# Patient Record
Sex: FEMALE | Race: BLACK OR AFRICAN AMERICAN | Employment: FULL TIME | URBAN - METROPOLITAN AREA
[De-identification: names, ages, dates, MRNs, and addresses within clinical notes are randomized per-mention and may not be internally consistent; named-entity substitution may affect disease eponyms.]

---

## 2020-09-10 ENCOUNTER — VIRTUAL VISIT (OUTPATIENT)
Dept: INTERNAL MEDICINE CLINIC | Age: 30
End: 2020-09-10

## 2020-09-10 DIAGNOSIS — E55.9 VITAMIN D DEFICIENCY: ICD-10-CM

## 2020-09-10 DIAGNOSIS — G43.809 OTHER MIGRAINE WITHOUT STATUS MIGRAINOSUS, NOT INTRACTABLE: Primary | ICD-10-CM

## 2020-09-10 DIAGNOSIS — E66.9 OBESITY (BMI 30-39.9): ICD-10-CM

## 2020-09-10 DIAGNOSIS — Z00.00 ROUTINE GENERAL MEDICAL EXAMINATION AT A HEALTH CARE FACILITY: ICD-10-CM

## 2020-09-10 PROCEDURE — 99204 OFFICE O/P NEW MOD 45 MIN: CPT | Performed by: INTERNAL MEDICINE

## 2020-09-10 RX ORDER — RIZATRIPTAN BENZOATE 10 MG/1
10 TABLET, ORALLY DISINTEGRATING ORAL
Qty: 12 TAB | Refills: 2 | Status: SHIPPED | OUTPATIENT
Start: 2020-09-10 | End: 2020-09-10

## 2020-09-10 RX ORDER — IBUPROFEN 200 MG
TABLET ORAL AS NEEDED
COMMUNITY
End: 2020-09-28 | Stop reason: DRUGHIGH

## 2020-09-10 NOTE — PROGRESS NOTES
Health Maintenance Due   Topic Date Due    DTaP/Tdap/Td series (1 - Tdap) 10/17/2013    PAP AKA CERVICAL CYTOLOGY  05/22/2018    Flu Vaccine (1) 09/01/2020       No chief complaint on file. 1. Have you been to the ER, urgent care clinic since your last visit? Hospitalized since your last visit? No    2. Have you seen or consulted any other health care providers outside of the [address] [place] [address] since your last visit? Include any pap smears or colon screening. No    3) Do you have an Advance Directive on file? no    4) Are you interested in receiving information on Advance Directives? NO      Patient is accompanied by self I have received verbal consent from Alysha Ibarra to discuss any/all medical information while they are present in the room.

## 2020-09-10 NOTE — PROGRESS NOTES
Purnima Giles is a 34 y.o. female who was seen by synchronous (real-time) audio-video technology on 9/10/2020 for Complete Physical (New pt; establish care); Migraine; and Obesity        Assessment & Plan:   Diagnoses and all orders for this visit:    1. Other migraine without status migrainosus, not intractable  She is having migraine attack at least 3 to 4 days a week. Probably needing regular daily maintenance therapy. Advised to avoid migraine triggers. Will try,  -     rizatriptan (MAXALT-MLT) 10 mg disintegrating tablet; Take 1 Tab by mouth once as needed for Migraine for up to 1 dose. Will refer,  -     REFERRAL TO NEUROLOGY  Follow-up in a month. 2. Routine general medical examination at a health care facility    Seems healthy but overweight. Advised to eat healthy exercise and lose weight. Will order,  -     CBC WITH AUTOMATED DIFF  -     METABOLIC PANEL, COMPREHENSIVE  -     LIPID PANEL  -     TSH 3RD GENERATION  -     URINALYSIS W/ RFLX MICROSCOPIC    3. Vitamin D deficiency    We will check,  -     VITAMIN D, 25 HYDROXY    4. Obesity (BMI 30-39. 9)    Addressed weight, diet and exercise with patient. Decrease carbohydrates (white foods, sweet foods, sweet drinks and alcohol), increase green leafy vegetables and protein (lean meats and beans) with each meal. Avoid fried foods. Eat 3-5 small meals daily. Do not skip meals. Increase water intake. Increase physical activity to 30 minutes daily for health benefit or 60 minutes daily to prevent weight regain, as tolerated. Get 7-8 hours uninterrupted sleep nightly. I spent at least 25 minutes on this visit with this established patient. Subjective:     Ms. Herring So is here to establish care. Reported migraine headache on and off very frequently. Pain is unilateral throbbing kind, associated with nausea sometimes photophobia. She has been suffering from it from early childhood. No migraine headache is almost 3-4 times a week.   She is not taking any prescription medicine for it. Denies numbness or weakness in the arms or legs. Most of the time migraine headache on left side of the head. Some residual above left eye. She has seen eye specialist, I was fine. She is legally blind on left eye. She is obese, trying to lose weight. Not doing exercise. Need physical.  Prior to Admission medications    Medication Sig Start Date End Date Taking? Authorizing Provider   ibuprofen (MOTRIN) 200 mg tablet Take  by mouth as needed for Pain. Yes Provider, Historical   rizatriptan (MAXALT-MLT) 10 mg disintegrating tablet Take 1 Tab by mouth once as needed for Migraine for up to 1 dose. 9/10/20 9/10/20 Yes Renee Allen MD   omeprazole (PRILOSEC) 40 mg capsule Take 1 Cap by mouth daily. 7/28/15 9/10/20  Orville Mena DO     Past Medical History:   Diagnosis Date    Abnormal Pap smear of cervix     Headache     LGSIL (low grade squamous intraepithelial dysplasia)     Pap smear for cervical cancer screening     Established with GYN       ROS significant for migraine headache.     Objective:     Patient-Reported Vitals 9/10/2020   Patient-Reported Weight 235lb   Patient-Reported LMP 8/20/20          Constitutional: [x] Appears well-developed and well-nourished [x] No apparent distress      [] Abnormal -     Mental status: [x] Alert and awake  [x] Oriented to person/place/time [x] Able to follow commands    [] Abnormal -   Bilateral eyes: EOMI, conjunctiva seems normal.  Sclera normal.    HENT: [x] Normocephalic, atraumatic  [] Abnormal -   [x] Mouth/Throat: Mucous membranes are moist    External Ears [x] Normal  [] Abnormal -    Neck: [x] No visualized mass [] Abnormal -     Pulmonary/Chest: [x] Respiratory effort normal   [x] No visualized signs of difficulty breathing or respiratory distress        [] Abnormal -      Musculoskeletal:   [x] Normal gait with no signs of ataxia         [x] Normal range of motion of neck        [] Abnormal - Neurological:        [x] No Facial Asymmetry (Cranial nerve 7 motor function) (limited exam due to video visit)          [x] No gaze palsy        [] Abnormal -          Skin:        [x] No significant exanthematous lesions or discoloration noted on facial skin         [] Abnormal -            Psychiatric:       [x] Normal Affect [] Abnormal -        [x] No Hallucinations    Other pertinent observable physical exam findings:-        We discussed the expected course, resolution and complications of the diagnosis(es) in detail. Medication risks, benefits, costs, interactions, and alternatives were discussed as indicated. I advised her to contact the office if her condition worsens, changes or fails to improve as anticipated. She expressed understanding with the diagnosis(es) and plan. Merline Ferris, who was evaluated through a patient-initiated, synchronous (real-time) audio-video encounter, and/or her healthcare decision maker, is aware that it is a billable service, with coverage as determined by her insurance carrier. She provided verbal consent to proceed: Yes, and patient identification was verified. It was conducted pursuant to the emergency declaration under the [address] [place] [address], [address] [place] [address] authority and the [employer] General Act. A caregiver was present when appropriate. Ability to conduct physical exam was limited. I was in the office. The patient was at home.       Dima Nunez MD

## 2020-09-28 ENCOUNTER — OFFICE VISIT (OUTPATIENT)
Dept: NEUROLOGY | Facility: CLINIC | Age: 30
End: 2020-09-28
Payer: COMMERCIAL

## 2020-09-28 VITALS
RESPIRATION RATE: 16 BRPM | SYSTOLIC BLOOD PRESSURE: 122 MMHG | DIASTOLIC BLOOD PRESSURE: 80 MMHG | WEIGHT: 247 LBS | BODY MASS INDEX: 39.7 KG/M2 | HEIGHT: 66 IN | HEART RATE: 92 BPM

## 2020-09-28 DIAGNOSIS — G43.709 CHRONIC MIGRAINE WITHOUT AURA WITHOUT STATUS MIGRAINOSUS, NOT INTRACTABLE: Primary | ICD-10-CM

## 2020-09-28 PROCEDURE — 99244 OFF/OP CNSLTJ NEW/EST MOD 40: CPT | Performed by: PSYCHIATRY & NEUROLOGY

## 2020-09-28 RX ORDER — RIZATRIPTAN BENZOATE 10 MG/1
10 TABLET, ORALLY DISINTEGRATING ORAL
COMMUNITY
End: 2021-04-14 | Stop reason: ALTCHOICE

## 2020-09-28 RX ORDER — PROMETHAZINE HYDROCHLORIDE 12.5 MG/1
12.5 TABLET ORAL
Qty: 30 TAB | Refills: 2 | OUTPATIENT
Start: 2020-09-28 | End: 2022-09-23

## 2020-09-28 RX ORDER — SUMATRIPTAN 100 MG/1
100 TABLET, FILM COATED ORAL
Qty: 9 TAB | Refills: 2 | Status: SHIPPED | OUTPATIENT
Start: 2020-09-28 | End: 2020-09-28

## 2020-09-28 RX ORDER — ESOMEPRAZOLE MAGNESIUM 20 MG/1
FOR SUSPENSION ORAL DAILY
COMMUNITY

## 2020-09-28 RX ORDER — IBUPROFEN 600 MG/1
600 TABLET ORAL
Qty: 30 TAB | Refills: 2 | Status: SHIPPED | OUTPATIENT
Start: 2020-09-28

## 2020-09-28 NOTE — PROGRESS NOTES
Ms. Negrito Nance presents as a new patient for evaluation of migraines. Depression screening done on patient.

## 2020-09-28 NOTE — PROGRESS NOTES
Chief Complaint   Patient presents with    Migraine         HISTORY OF PRESENT ILLNESS  Xi Huerta is a 34 y.o. female who came in for neurological consultation requested by Dr. Sid Helms. That she has had headaches/migraines that started around age 10. She was doing well for a few years but this year they have started to come on more frequently. She has had 2 leave work or woke up with a severe headache at least 10-12 times in the last month. She describes her headaches as mainly unilateral, retro-orbital, left-sided sharp throbbing headache associated with light and sound sensitivity. Sometimes she will feel nauseous. No morning before the headache and no auras. No focal motor or sensory symptoms during the headache. 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 No significant family history of migraines. She takes Maxalt which used to work very well but now it does not provide complete relief and headache tends to come back. She has never been on any prophylactics. She does not know of any triggers      Past Medical History:   Diagnosis Date    Abnormal Pap smear of cervix     Headache     LGSIL (low grade squamous intraepithelial dysplasia)     Pap smear for cervical cancer screening     Established with GYN     Current Outpatient Medications   Medication Sig    Esomeprazole Magnesium Take  by mouth daily.  rizatriptan (MAXALT-MLT) 10 mg disintegrating tablet Take 10 mg by mouth once as needed for Migraine.  SUMAtriptan (IMITREX) 100 mg tablet Take 1 Tab by mouth once as needed for Migraine for up to 1 dose.  ibuprofen (MOTRIN) 600 mg tablet Take 1 Tab by mouth every six (6) hours as needed for Pain.  promethazine (PHENERGAN) 12.5 mg tablet Take 1 Tab by mouth every six (6) hours as needed for Nausea. No current facility-administered medications for this visit.       No Known Allergies  Family History   Problem Relation Age of Onset    Heart Disease Mother     Diabetes Mother     Hypertension Mother     Hypertension Father     Cancer Father         breast ca    Cancer Maternal Grandmother         breast ca     Social History     Tobacco Use    Smoking status: Never Smoker    Smokeless tobacco: Never Used   Substance Use Topics    Alcohol use: Yes     Comment: social    Drug use: No     Past Surgical History:   Procedure Laterality Date    HX GYN  10-    , D&C    HX WISDOM TEETH EXTRACTION           REVIEW OF SYSTEMS  Review of Systems - History obtained from the patient  Psychological ROS: negative  ENT ROS: negative  Hematological and Lymphatic ROS: negative  Endocrine ROS: negative  Respiratory ROS: no cough, shortness of breath, or wheezing  Cardiovascular ROS: no chest pain or dyspnea on exertion  Gastrointestinal ROS: no abdominal pain, change in bowel habits, or black or bloody stools  Genito-Urinary ROS: no dysuria, trouble voiding, or hematuria  Musculoskeletal ROS: negative  Dermatological ROS: negative      PHYSICAL EXAMINATION:    Visit Vitals  /80   Pulse 92   Resp 16   Ht 5' 6\" (1.676 m)   Wt 112 kg (247 lb)   BMI 39.87 kg/m²     General:  Well nourished and groomed individual in no acute distress. Neck: Supple, nontender, no bruits, no pain with resistance to active range of motion. Heart: Regular rate and rhythm. Normal S1S2. Lungs:  Equal chest expansion, no cough, no wheeze  Musculoskeletal:  Extremities revealed no edema and had full range of motion of joints. Psych:  Good mood and bright affect    NEUROLOGICAL EXAMINATION:     Mental Status:   Alert and oriented to person, place, and time with recent and remote memory intact. Attention span and concentration are normal. Speech is fluent. Cranial Nerves:    II, III, IV, VI:  Visual acuity grossly intact. Visual fields are normal.    Pupils are equal, round, and reactive to light and accommodation.     Extra-ocular movements are full and fluid. Fundoscopic exam was benign, no ptosis or nystagmus. V-XII: Hearing is grossly intact. Facial features are symmetric, with normal sensation and strength. The palate rises symmetrically and the tongue protrudes midline. Sternocleidomastoids 5/5. Motor Examination: Normal tone, bulk, and strength. 5/5 muscle strength throughout. No cogwheel rigidity or clonus present. Sensory exam:  Normal throughout to pinprick, temperature, and vibration sense. Normal proprioception. Coordination:  Finger to nose and rapid arm movement testing was normal.   No resting or intention tremor    Gait and Station:  Steady while walking on toes, heels, and with tandem walking. Normal arm swing. No Rhomberg or pronator drift. No muscle wasting or fasiculations noted. Reflexes:  DTRs 2+ throughout. Toes downgoing. LABS / IMAGING  She has had brain imaging in the past and it was negative    ASSESSMENT    ICD-10-CM ICD-9-CM    1. Chronic migraine without aura without status migrainosus, not intractable  G43.709 346.70 SUMAtriptan (IMITREX) 100 mg tablet      ibuprofen (MOTRIN) 600 mg tablet      promethazine (PHENERGAN) 12.5 mg tablet       DISCUSSION  Ms. Elba Strauss has common migraines without aura  The treatment strategies for migraines were reviewed at length including potential dietary and environmental triggers. she was advised to keep a headache log so as to identify and avoid these triggers. May use sumatriptan with or without an NSAID for abortive therapy at the onset of headache. Dose can be repeated in 2 hours if needed. Avoid using them for more than 2 days per week to prevent analgesic overuse and rebound headache  Nutritional supplements for migraine prevention were discussed  Deferring any prescription prophylactics for now    Thank you for allowing me to participate in the care of Ms. Caitie Velez.  Please feel free to contact me if you have any questions. I will be happy to follow to follow her along with you. Juan Richmond MD  Diplomate, American Board of Psychiatry & Neurology (Neurology)  Joseline Trimble Board of Psychiatry & Neurology (Clinical Neurophysiology)  Diplomate, American Board of Electrodiagnostic Medicine  This note will not be viewable in [address]

## 2020-09-28 NOTE — PATIENT INSTRUCTIONS
[facility]   Statement to Patients  April 1, 2014      In an effort to ensure the large volume of patient prescription refills is processed in the most efficient and expeditious manner, we are asking our patients to assist us by calling your Pharmacy for all prescription refills, this will include also your  Mail Order Pharmacy. The pharmacy will contact our office electronically to continue the refill process. Please do not wait until the last minute to call your pharmacy. We need at least 48 hours (2days) to fill prescriptions. We also encourage you to call your pharmacy before going to  your prescription to make sure it is ready. With regard to controlled substance prescription refill requests (narcotic refills) that need to be picked up at our office, we ask your cooperation by providing us with at least 72 hours (3days) notice that you will need a refill. We will not refill narcotic prescription refill requests after 4:00pm on any weekday, Monday through Thursday, or after 2:00pm on Fridays, or on the weekends. We encourage everyone to explore another way of getting your prescription refill request processed using [employer], our patient web portal through our electronic medical record system. [employer] is an efficient and effective way to communicate your medication request directly to the office and  downloadable as an britt on your smart phone . [employer] also features a review functionality that allows you to view your medication list as well as leave messages for your physician. Are you ready to get connected? If so please review the attatched instructions or speak to any of our staff to get you set up right away! Thank you so much for your cooperation. Should you have any questions please contact our Practice Administrator.     The Physicians and Staff,  [facility]

## 2020-09-29 ENCOUNTER — TELEPHONE (OUTPATIENT)
Dept: INTERNAL MEDICINE CLINIC | Age: 30
End: 2020-09-29

## 2020-09-29 NOTE — TELEPHONE ENCOUNTER
Patient called stating that she was having an issue with the left side of her body and she has some additional questions. Please call her back at

## 2020-09-30 ENCOUNTER — VIRTUAL VISIT (OUTPATIENT)
Dept: INTERNAL MEDICINE CLINIC | Age: 30
End: 2020-09-30
Payer: COMMERCIAL

## 2020-09-30 DIAGNOSIS — G43.809 OTHER MIGRAINE WITHOUT STATUS MIGRAINOSUS, NOT INTRACTABLE: ICD-10-CM

## 2020-09-30 DIAGNOSIS — R10.9 FLANK PAIN: ICD-10-CM

## 2020-09-30 DIAGNOSIS — M62.838 MUSCLE SPASM: Primary | ICD-10-CM

## 2020-09-30 DIAGNOSIS — N92.0 MENORRHAGIA WITH REGULAR CYCLE: ICD-10-CM

## 2020-09-30 PROCEDURE — 99214 OFFICE O/P EST MOD 30 MIN: CPT | Performed by: INTERNAL MEDICINE

## 2020-09-30 RX ORDER — BACLOFEN 10 MG/1
10 TABLET ORAL 2 TIMES DAILY
Qty: 20 TAB | Refills: 0 | Status: SHIPPED | OUTPATIENT
Start: 2020-09-30 | End: 2021-04-14 | Stop reason: ALTCHOICE

## 2020-09-30 NOTE — TELEPHONE ENCOUNTER
Returned the pt's call and after verifying HIPAA discussed her s/sx. Per the pt she has been having left sided abdominal pain that occasionally radiates to the front of her stomach and down to her uterus area. She wants to discuss this with Dr. Shai Barfield to determine what testing may be necessary. Appt made for today @ 1500. The pt voiced thanks and understanding.

## 2020-09-30 NOTE — PROGRESS NOTES
Enmanuel Mcdaniel is a 34 y.o. female who was seen by synchronous (real-time) audio-video technology on 9/30/2020 for Abdominal Pain; Nausea; Back Pain; and Headache        Assessment & Plan:   Diagnoses and all orders for this visit:    1. Muscle spasm    Probably positional.  Pain from a left-sided lower back radiating to her hip and groin. Advised her to use heating pad and massage. Will give,  -     baclofen (LIORESAL) 10 mg tablet; Take 1 Tab by mouth two (2) times a day. 2. Flank pain  No urinary frequency, no bowel problem. Probably muscular. 3. Menorrhagia with regular cycle  She is chewing a lot of ice. Advised her to do blood work. 4. Other migraine without status migrainosus, not intractable  Maxalt is working too late. She has seen neurologist and placed on Imitrex. Advised to avoid migraine trigger. Advised not to take Motrin for headache. I spent at least 25 minutes on this visit with this established patient. Subjective: Shai New report pain on the lower left side of the back for past several days. Pain from this left-sided flank radiating sometimes to left side hip or lower abdomen. Has regular bowel movement, no urinary discomfort or urinary frequency. No fever. No blood in the stool or urine. Pain says slightly better now. Has menorrhagia, chewing a lot of ice. Did not do lab work yet. Has migraine headache. Has seen neurologist.  Gabino Milton was changed to Imitrex. She did not pick it up yet. Did not do lab work yet. Need flu vaccine. Prior to Admission medications    Medication Sig Start Date End Date Taking? Authorizing Provider   baclofen (LIORESAL) 10 mg tablet Take 1 Tab by mouth two (2) times a day. 9/30/20  Yes Oneil Restrepo MD   Esomeprazole Magnesium Take  by mouth daily. Yes Provider, Historical   rizatriptan (MAXALT-MLT) 10 mg disintegrating tablet Take 10 mg by mouth once as needed for Migraine.    Yes Provider, Historical   ibuprofen (MOTRIN) 600 mg tablet Take 1 Tab by mouth every six (6) hours as needed for Pain. 9/28/20  Yes Luz Jeffers MD   promethazine (PHENERGAN) 12.5 mg tablet Take 1 Tab by mouth every six (6) hours as needed for Nausea. 9/28/20  Yes Luz Jeffers MD     Past Medical History:   Diagnosis Date    Abnormal Pap smear of cervix     Headache     LGSIL (low grade squamous intraepithelial dysplasia)     Pap smear for cervical cancer screening     Established with GYN       ROS significant for back pain. And headache. Objective:     Patient-Reported Vitals 9/10/2020   Patient-Reported Weight 235lb   Patient-Reported LMP 8/20/20          Constitutional: [x] Appears well-developed and well-nourished [x] No apparent distress      [] Abnormal -     Mental status: [x] Alert and awake  [x] Oriented to person/place/time [x] Able to follow commands    [] Abnormal -       HENT: [x] Normocephalic, atraumatic  [] Abnormal -   [x] Mouth/Throat: Mucous membranes are moist    External Ears [x] Normal  [] Abnormal -    Neck: [x] No visualized mass [] Abnormal -     Pulmonary/Chest: [x] Respiratory effort normal   [x] No visualized signs of difficulty breathing or respiratory distress        [] Abnormal -      Musculoskeletal:   Lower back: Spine normal.  Left-sided stiffness and pain present. Range of motion mildly restricted. No pain in the abdomen. Neurological:        [x] No Facial Asymmetry (Cranial nerve 7 motor function) (limited exam due to video visit)          [x] No gaze palsy        [] Abnormal -          Skin:        [x] No significant exanthematous lesions or discoloration noted on facial skin    No rash noticed. [] Abnormal -            Psychiatric:       [x] Normal Affect [] Abnormal -        [x] No Hallucinations    Other pertinent observable physical exam findings:-        We discussed the expected course, resolution and complications of the diagnosis(es) in detail.   Medication risks, benefits, costs, interactions, and alternatives were discussed as indicated. I advised her to contact the office if her condition worsens, changes or fails to improve as anticipated. She expressed understanding with the diagnosis(es) and plan. Enmanuel Mcdaniel, who was evaluated through a patient-initiated, synchronous (real-time) audio-video encounter, and/or her healthcare decision maker, is aware that it is a billable service, with coverage as determined by her insurance carrier. She provided verbal consent to proceed: Yes, and patient identification was verified. It was conducted pursuant to the emergency declaration under the [address] [place] [address], [address] [place] [address] authority and the [employer] Act. A caregiver was present when appropriate. Ability to conduct physical exam was limited. I was in the office. The patient was at home.       Alton Malik MD

## 2020-09-30 NOTE — PROGRESS NOTES
Health Maintenance Due   Topic Date Due    DTaP/Tdap/Td series (1 - Tdap) 10/17/2013    PAP AKA CERVICAL CYTOLOGY  05/22/2018    Flu Vaccine (1) 09/01/2020       Chief Complaint   Patient presents with    Abdominal Pain    Nausea       1. Have you been to the ER, urgent care clinic since your last visit? Hospitalized since your last visit? No    2. Have you seen or consulted any other health care providers outside of the [address] [place] [address] since your last visit? Include any pap smears or colon screening. No    3) Do you have an Advance Directive on file? no    4) Are you interested in receiving information on Advance Directives? NO      Patient is accompanied by self I have received verbal consent from Ajay Dominguez to discuss any/all medical information while they are present in the room.

## 2020-10-07 ENCOUNTER — TELEPHONE (OUTPATIENT)
Dept: INTERNAL MEDICINE CLINIC | Age: 30
End: 2020-10-07

## 2020-10-07 NOTE — TELEPHONE ENCOUNTER
Called the pt and after verifying HIPAA advised her that no forms were received. Provided her the office fax to have her job send over [employer] forms. The pt voiced thanks and understanding.

## 2020-10-08 ENCOUNTER — TELEPHONE (OUTPATIENT)
Dept: INTERNAL MEDICINE CLINIC | Age: 30
End: 2020-10-08

## 2020-10-12 NOTE — TELEPHONE ENCOUNTER
Call placed to patient and made aware that forms received and advised that FMLA to be completed today and per patient fax to number on fax cover sheet from [employer]

## 2021-04-14 ENCOUNTER — VIRTUAL VISIT (OUTPATIENT)
Dept: INTERNAL MEDICINE CLINIC | Age: 31
End: 2021-04-14
Payer: COMMERCIAL

## 2021-04-14 DIAGNOSIS — G43.109 MIGRAINE WITH AURA AND WITHOUT STATUS MIGRAINOSUS, NOT INTRACTABLE: Primary | ICD-10-CM

## 2021-04-14 DIAGNOSIS — Z00.00 ROUTINE GENERAL MEDICAL EXAMINATION AT A HEALTH CARE FACILITY: ICD-10-CM

## 2021-04-14 DIAGNOSIS — E66.9 OBESITY (BMI 30-39.9): ICD-10-CM

## 2021-04-14 DIAGNOSIS — R03.0 ELEVATED BP WITHOUT DIAGNOSIS OF HYPERTENSION: ICD-10-CM

## 2021-04-14 DIAGNOSIS — J30.1 ALLERGIC RHINITIS DUE TO POLLEN, UNSPECIFIED SEASONALITY: ICD-10-CM

## 2021-04-14 DIAGNOSIS — E55.9 VITAMIN D DEFICIENCY: ICD-10-CM

## 2021-04-14 DIAGNOSIS — D50.0 IRON DEFICIENCY ANEMIA DUE TO CHRONIC BLOOD LOSS: ICD-10-CM

## 2021-04-14 DIAGNOSIS — Z11.59 NEED FOR HEPATITIS C SCREENING TEST: ICD-10-CM

## 2021-04-14 PROCEDURE — 99214 OFFICE O/P EST MOD 30 MIN: CPT | Performed by: INTERNAL MEDICINE

## 2021-04-14 RX ORDER — SUMATRIPTAN 100 MG/1
TABLET, FILM COATED ORAL
COMMUNITY
End: 2021-04-14 | Stop reason: ALTCHOICE

## 2021-04-14 RX ORDER — ZOLMITRIPTAN 5 MG/1
5 TABLET, ORALLY DISINTEGRATING ORAL AS NEEDED
Qty: 12 TAB | Refills: 2 | Status: SHIPPED | OUTPATIENT
Start: 2021-04-14

## 2021-04-14 RX ORDER — LEVOCETIRIZINE DIHYDROCHLORIDE 5 MG/1
5 TABLET, FILM COATED ORAL
Qty: 30 TAB | Refills: 1 | Status: SHIPPED | OUTPATIENT
Start: 2021-04-14

## 2021-04-14 RX ORDER — IRON,CARBONYL/ASCORBIC ACID 65MG-125MG
TABLET, DELAYED RELEASE (ENTERIC COATED) ORAL
Qty: 30 TAB | Refills: 3 | Status: SHIPPED | OUTPATIENT
Start: 2021-04-14

## 2021-04-14 RX ORDER — FERROUS SULFATE 137(45) MG
TABLET, EXTENDED RELEASE ORAL
COMMUNITY
Start: 2021-02-01 | End: 2021-04-14 | Stop reason: ALTCHOICE

## 2021-04-14 RX ORDER — ERGOCALCIFEROL 1.25 MG/1
CAPSULE ORAL
COMMUNITY
Start: 2021-02-01

## 2021-04-14 RX ORDER — ATENOLOL 25 MG/1
25 TABLET ORAL DAILY
Qty: 30 TAB | Refills: 2 | Status: SHIPPED | OUTPATIENT
Start: 2021-04-14

## 2021-04-14 NOTE — PROGRESS NOTES
Health Maintenance Due   Topic Date Due    Hepatitis C Screening  Never done    COVID-19 Vaccine (1) Never done    DTaP/Tdap/Td series (1 - Tdap) Never done    PAP AKA CERVICAL CYTOLOGY  05/22/2018       Chief Complaint   Patient presents with    Obesity     6 month follow up    Headache     states seen by neurology sumatriptan is not working anymore    Anemia     put on iron supplement from OB/GYN       1. Have you been to the ER, urgent care clinic since your last visit? Hospitalized since your last visit? No    2. Have you seen or consulted any other health care providers outside of the [address] [place] [address] since your last visit? Include any pap smears or colon screening. No    3) Do you have an Advance Directive on file? no    4) Are you interested in receiving information on Advance Directives? NO      Patient is accompanied by self I have received verbal consent from Bethanie Luna to discuss any/all medical information while they are present in the room.

## 2021-04-14 NOTE — PROGRESS NOTES
Antonio Chester is a 31 y.o. female who was seen by synchronous (real-time) audio-video technology on 4/14/2021 for Obesity (6 month follow up), Headache (states seen by neurology sumatriptan is not working anymore), and Anemia (put on iron supplement from OB/GYN)        Assessment & Plan:   Diagnoses and all orders for this visit:    1. Migraine with aura and without status migrainosus, not intractable    We will discontinue sumatriptan. Will give,  -     ZOLMitriptan (ZOMIG-ZMT) 5 mg disintegrating tablet; Take 1 Tab by mouth as needed for Other (headache). DC sumatriptan    Headache is too frequent, will add,  -     atenoloL (TENORMIN) 25 mg tablet; Take 1 Tab by mouth daily. Follow-up in a month. 2. Elevated BP without diagnosis of hypertension     My Recommendations  -Purchase a blood pressure cuff that goes around your upper arm and check blood pressure at least 3 times per week. Write down your numbers for review. Check blood pressure in the morning and evening. Rest for 5 minutes with feet elevated and arm resting on a table (at mid-chest level) when checking blood pressure  -Exercise 30-60 minutes most days of the week, preferably with a mix of cardiovascular and strength training. Exercise can improve blood pressure, even if you do not lose weight!  -Limit alcohol intake to less than 2-3 drinks daily   -Avoid tobacco products  -Avoid caffeine, energy drinks, stimulants  -DASH diet - includes fruits, vegetables, fiber, and less than 2000 mg sodium (salt) daily. Try to eat less than 3506-0592 calories daily. Limit fat intake.    -Avoid non-steroidal inflammatory medications (NSAIDS) such as ibuprofen, advil, motrin, aleve, and naproxyn as these may increase blood pressure if used long-term  -Avoid OTC decongestants such as pseudopherine, phenylephrine, Afrin    3. Obesity (BMI 30-39. 9)  Addressed weight, diet and exercise with patient.  Decrease carbohydrates (white foods, sweet foods, sweet drinks and alcohol), increase green leafy vegetables and protein (lean meats and beans) with each meal. Avoid fried foods. Eat 3-5 small meals daily. Do not skip meals. Increase water intake. Increase physical activity to 30 minutes daily for health benefit or 60 minutes daily to prevent weight regain, as tolerated. Get 7-8 hours uninterrupted sleep nightly. 4. Routine general medical examination at a health care facility  -     CBC WITH AUTOMATED DIFF; Future  -     METABOLIC PANEL, COMPREHENSIVE; Future  -     TSH 3RD GENERATION; Future  -     LIPID PANEL; Future  -     URINALYSIS W/ REFLEX CULTURE; Future    5. Need for hepatitis C screening test  -     HEPATITIS C AB; Future    6. Vitamin D deficiency  -     VITAMIN D, 25 HYDROXY; Future    7. Iron deficiency anemia due to chronic blood loss    We will change Slow Fe,  -     iron,carbonyl-vitamin C (Vitron-C) 65 mg iron- 125 mg TbEC; 1 tab po am,Dc slow iron    8. Allergic rhinitis due to pollen, unspecified seasonality  -     levocetirizine (XYZAL) 5 mg tablet; Take 1 Tab by mouth daily as needed for Allergies. I spent at least 30 minutes on this visit with this established patient. Subjective:   Ms. Brayan Ball is here for follow-up. Complaining of persistent migraine attack. As she is having migraine headache at least a 2-3 times a week. She has been taking sumatriptan given by neurologist, not helping much. Her migraine is associated with photophobia and nausea. Monitoring blood pressure, this morning systolic blood pressure is high, she has been having a lot of saltine crackers lately. Advised to watch salt intake. Reported nasal congestion and postnasal drip and lot of sneezing lately. No cough or shortness of breath. Not exposed to Covid. No fever. She went to GYN, diagnosed with iron deficiency anemia. She has prescribed Slow Fe which she is not able to tolerate. Making her more nauseous. Wanted me to check the medicine.   She is obese, trying to watch diet and exercise. Did not do lab work yet. Did not receive Covid vaccine yet. Prior to Admission medications    Medication Sig Start Date End Date Taking? Authorizing Provider   ergocalciferol (ERGOCALCIFEROL) 1,250 mcg (50,000 unit) capsule TAKE 1 CAPSULE BY MOUTH WEEKLY 2/1/21  Yes Provider, Historical   ZOLMitriptan (ZOMIG-ZMT) 5 mg disintegrating tablet Take 1 Tab by mouth as needed for Other (headache). DC sumatriptan 4/14/21  Yes Alvin Fair MD   atenoloL (TENORMIN) 25 mg tablet Take 1 Tab by mouth daily. 4/14/21  Yes Alvin Fair MD   iron,carbonyl-vitamin C (Vitron-C) 65 mg iron- 125 mg TbEC 1 tab po am,Dc slow iron 4/14/21  Yes Alvin Fair MD   levocetirizine (XYZAL) 5 mg tablet Take 1 Tab by mouth daily as needed for Allergies. 4/14/21  Yes Alvin Fair MD   Esomeprazole Magnesium Take  by mouth daily. Yes Provider, Historical   ibuprofen (MOTRIN) 600 mg tablet Take 1 Tab by mouth every six (6) hours as needed for Pain. 9/28/20  Yes Ibeth Davila MD   promethazine (PHENERGAN) 12.5 mg tablet Take 1 Tab by mouth every six (6) hours as needed for Nausea. 9/28/20  Yes Ibeth Davila MD   Slow Fe 142 mg (45 mg iron) ER tablet TAKE 1 TABLET BY MOUTH TWICE A DAY AS TOLERATED 2/1/21 4/14/21  Provider, Historical   SUMAtriptan (IMITREX) 100 mg tablet sumatriptan 100 mg tablet   TAKE 1 TABLET BY MOUTH ONCE AS NEEDED FOR MIGRAINE FOR UP TO 1 DOSE  4/14/21  Provider, Historical   baclofen (LIORESAL) 10 mg tablet Take 1 Tab by mouth two (2) times a day. 9/30/20 4/14/21  Alvin Fair MD   rizatriptan (MAXALT-MLT) 10 mg disintegrating tablet Take 10 mg by mouth once as needed for Migraine.   4/14/21  Provider, Historical     Past Medical History:   Diagnosis Date    Abnormal Pap smear of cervix     Headache     LGSIL (low grade squamous intraepithelial dysplasia)     Pap smear for cervical cancer screening     Established with GYN       ROS    Objective:     Patient-Reported Vitals 4/14/2021   Patient-Reported Weight 245lb   Patient-Reported Pulse 84   Patient-Reported Systolic  721   Patient-Reported Diastolic 74   Patient-Reported LMP 3/21/2021          Constitutional: [x] Appears well-developed and well-nourished [x] No apparent distress      [] Abnormal -     Mental status: [x] Alert and awake  [x] Oriented to person/place/time [x] Able to follow commands    [] Abnormal -          HENT: [x] Normocephalic, atraumatic  [] Abnormal -   [x] Mouth/Throat: Mucous membranes are moist  Nasal congestion present. External Ears [x] Normal  [] Abnormal -    Neck: [x] No visualized mass [] Abnormal -     Pulmonary/Chest: [x] Respiratory effort normal   [x] No visualized signs of difficulty breathing or respiratory distress        [] Abnormal -      Musculoskeletal:   [x] Normal gait with no signs of ataxia         [x] Normal range of motion of neck        [] Abnormal -     Neurological:        [x] No Facial Asymmetry (Cranial nerve 7 motor function) (limited exam due to video visit)          [x] No gaze palsy        [] Abnormal -                   Psychiatric:       [x] Normal Affect [] Abnormal -        [x] No Hallucinations    Other pertinent observable physical exam findings:-        We discussed the expected course, resolution and complications of the diagnosis(es) in detail. Medication risks, benefits, costs, interactions, and alternatives were discussed as indicated. I advised her to contact the office if her condition worsens, changes or fails to improve as anticipated. She expressed understanding with the diagnosis(es) and plan. Emeterio Oswald, was evaluated through a synchronous (real-time) audio-video encounter. The patient (or guardian if applicable) is aware that this is a billable service. Verbal consent to proceed has been obtained within the past 12 months.  The visit was conducted pursuant to the emergency declaration under the [facility] Act, [facility] waiver authority and the [employer] General Act. Patient identification was verified, and a caregiver was present when appropriate. The patient was located in a state where the provider was credentialed to provide care.       London Bailey MD

## 2022-01-14 ENCOUNTER — OFFICE VISIT (OUTPATIENT)
Dept: INTERNAL MEDICINE CLINIC | Age: 32
End: 2022-01-14
Payer: COMMERCIAL

## 2022-01-14 VITALS
HEIGHT: 66 IN | DIASTOLIC BLOOD PRESSURE: 82 MMHG | BODY MASS INDEX: 40.18 KG/M2 | RESPIRATION RATE: 20 BRPM | WEIGHT: 250 LBS | TEMPERATURE: 98.8 F | SYSTOLIC BLOOD PRESSURE: 124 MMHG

## 2022-01-14 DIAGNOSIS — R10.12 LEFT UPPER QUADRANT ABDOMINAL PAIN: Primary | ICD-10-CM

## 2022-01-14 DIAGNOSIS — R10.2 PELVIC PAIN: ICD-10-CM

## 2022-01-14 PROCEDURE — 99213 OFFICE O/P EST LOW 20 MIN: CPT | Performed by: INTERNAL MEDICINE

## 2022-01-14 RX ORDER — DICYCLOMINE HYDROCHLORIDE 10 MG/1
10 CAPSULE ORAL 4 TIMES DAILY
Qty: 120 CAPSULE | Refills: 0 | Status: SHIPPED | OUTPATIENT
Start: 2022-01-14 | End: 2022-02-07

## 2022-01-14 NOTE — PROGRESS NOTES
HISTORY OF PRESENT ILLNESS  Jake Mejía is a 36 y.o. female. Patient was seen after she was seen at the ER ([facility]) a few days ago for lower left stomach pain. Reports that it also radiates into the groin. States that she was tested for pregnancy, negative. All her labs were stable. Had a transvaginal US and outer. It just showed that her uterus was thick. Reports that it continues to get her pain. Food does not harm it. No fever. And no radiation. Reports that it does wax and wane but is always there. Does reports some tenderness to the area, but its is deep. Visit Vitals  /82 (BP 1 Location: Right arm, BP Patient Position: Sitting, BP Cuff Size: Adult)   Temp 98.8 °F (37.1 °C) (Oral)   Resp 20   Ht 5' 6\" (1.676 m)   Wt 250 lb (113.4 kg)   LMP 2021   BMI 40.35 kg/m²     Past Medical History:   Diagnosis Date    Abnormal Pap smear of cervix     Headache     LGSIL (low grade squamous intraepithelial dysplasia)     Pap smear for cervical cancer screening     Established with GYN     Past Surgical History:   Procedure Laterality Date    HX GYN  10-    , D&C    HX WISDOM TEETH EXTRACTION       Family History   Problem Relation Age of Onset    Heart Disease Mother     Diabetes Mother     Hypertension Mother     Hypertension Father     Cancer Father         breast ca    Cancer Maternal Grandmother         breast ca     Outpatient Encounter Medications as of 2022   Medication Sig Dispense Refill    dicyclomine (BENTYL) 10 mg capsule Take 1 Capsule by mouth four (4) times daily. 120 Capsule 0    ergocalciferol (ERGOCALCIFEROL) 1,250 mcg (50,000 unit) capsule TAKE 1 CAPSULE BY MOUTH WEEKLY      ZOLMitriptan (ZOMIG-ZMT) 5 mg disintegrating tablet Take 1 Tab by mouth as needed for Other (headache). DC sumatriptan 12 Tab 2    atenoloL (TENORMIN) 25 mg tablet Take 1 Tab by mouth daily.  30 Tab 2    iron,carbonyl-vitamin C (Vitron-C) 65 mg iron- 125 mg TbEC 1 tab po am,Dc slow iron 30 Tab 3    levocetirizine (XYZAL) 5 mg tablet Take 1 Tab by mouth daily as needed for Allergies. 30 Tab 1    Esomeprazole Magnesium Take  by mouth daily.  ibuprofen (MOTRIN) 600 mg tablet Take 1 Tab by mouth every six (6) hours as needed for Pain. 30 Tab 2    promethazine (PHENERGAN) 12.5 mg tablet Take 1 Tab by mouth every six (6) hours as needed for Nausea. 30 Tab 2     No facility-administered encounter medications on file as of 1/14/2022. HPI    Review of Systems   Constitutional: Negative. Respiratory: Negative. Cardiovascular: Negative. Gastrointestinal: Positive for abdominal pain. Negative for nausea and vomiting. Neurological: Negative. Psychiatric/Behavioral: Negative. Physical Exam  Vitals and nursing note reviewed. Cardiovascular:      Rate and Rhythm: Normal rate and regular rhythm. Pulmonary:      Effort: Pulmonary effort is normal.      Breath sounds: Normal breath sounds. Abdominal:      Palpations: Abdomen is soft. Tenderness: Negative signs include Rovsing's sign and McBurney's sign. Skin:     General: Skin is warm. Neurological:      Mental Status: She is alert and oriented to person, place, and time. ASSESSMENT and PLAN  Diagnoses and all orders for this visit:    1. Left upper quadrant abdominal pain  -     CT ABD W CONT AND PELVIS W WO CONT; Future  -     dicyclomine (BENTYL) 10 mg capsule; Take 1 Capsule by mouth four (4) times daily.     2. Pelvic pain  -     CT ABD W CONT AND PELVIS W WO CONT; Future          lab results and schedule of future lab studies reviewed with patient  reviewed diet, exercise and weight control  reviewed medications and side effects in detail

## 2022-01-14 NOTE — PROGRESS NOTES
Health Maintenance Due   Topic Date Due    Hepatitis C Screening  Never done    COVID-19 Vaccine (1) Never done    DTaP/Tdap/Td series (1 - Tdap) Never done    Pap Smear  05/22/2018    Flu Vaccine (1) Never done       Chief Complaint   Patient presents with    ED Follow-up    Abdominal Pain    Medication Refill       1. Have you been to the ER, urgent care clinic since your last visit? Hospitalized since your last visit? yes [facility] abdominal pain 01/12/21    2. Have you seen or consulted any other health care providers outside of the [address] [place] [address] since your last visit? Include any pap smears or colon screening. No    3) Do you have an Advance Directive on file? no    4) Are you interested in receiving information on Advance Directives? NO      Patient is accompanied by self I have received verbal consent from Silvia Han to discuss any/all medical information while they are present in the room.

## 2022-01-18 ENCOUNTER — TELEPHONE (OUTPATIENT)
Dept: INTERNAL MEDICINE CLINIC | Age: 32
End: 2022-01-18

## 2022-01-18 DIAGNOSIS — R10.2 PELVIC PAIN: ICD-10-CM

## 2022-01-18 DIAGNOSIS — R10.12 LEFT UPPER QUADRANT ABDOMINAL PAIN: Primary | ICD-10-CM

## 2022-02-04 ENCOUNTER — HOSPITAL ENCOUNTER (OUTPATIENT)
Dept: CT IMAGING | Age: 32
Discharge: HOME | End: 2022-02-04
Attending: INTERNAL MEDICINE

## 2022-02-04 DIAGNOSIS — R10.12 LEFT UPPER QUADRANT ABDOMINAL PAIN: ICD-10-CM

## 2022-02-04 DIAGNOSIS — R10.2 PELVIC PAIN: ICD-10-CM

## 2022-02-07 DIAGNOSIS — R10.12 LEFT UPPER QUADRANT ABDOMINAL PAIN: ICD-10-CM

## 2022-02-07 RX ORDER — DICYCLOMINE HYDROCHLORIDE 10 MG/1
10 CAPSULE ORAL 4 TIMES DAILY
Qty: 120 CAPSULE | Refills: 0 | OUTPATIENT
Start: 2022-02-07 | End: 2022-09-23

## 2022-09-23 ENCOUNTER — HOSPITAL ENCOUNTER (EMERGENCY)
Age: 32
Discharge: HOME | End: 2022-09-23
Attending: STUDENT IN AN ORGANIZED HEALTH CARE EDUCATION/TRAINING PROGRAM | Admitting: STUDENT IN AN ORGANIZED HEALTH CARE EDUCATION/TRAINING PROGRAM
Payer: COMMERCIAL

## 2022-09-23 VITALS
OXYGEN SATURATION: 99 % | DIASTOLIC BLOOD PRESSURE: 96 MMHG | SYSTOLIC BLOOD PRESSURE: 152 MMHG | HEART RATE: 82 BPM | TEMPERATURE: 98 F | RESPIRATION RATE: 16 BRPM

## 2022-09-23 DIAGNOSIS — N94.6 DYSMENORRHEA: Primary | ICD-10-CM

## 2022-09-23 DIAGNOSIS — E86.0 DEHYDRATION: ICD-10-CM

## 2022-09-23 DIAGNOSIS — R11.2 NAUSEA AND VOMITING, UNSPECIFIED VOMITING TYPE: ICD-10-CM

## 2022-09-23 LAB
ANION GAP SERPL CALC-SCNC: 3 MMOL/L (ref 5–15)
APPEARANCE UR: ABNORMAL
BACTERIA URNS QL MICRO: ABNORMAL /HPF
BILIRUB UR QL: NEGATIVE
BUN SERPL-MCNC: 11 MG/DL (ref 6–20)
BUN/CREAT SERPL: 11 (ref 12–20)
CALCIUM SERPL-MCNC: 9 MG/DL (ref 8.5–10.1)
CHLORIDE SERPL-SCNC: 109 MMOL/L (ref 97–108)
CO2 SERPL-SCNC: 25 MMOL/L (ref 21–32)
COLOR UR: ABNORMAL
COMMENT, HOLDF: NORMAL
CREAT SERPL-MCNC: 1.01 MG/DL (ref 0.55–1.02)
EPITH CASTS URNS QL MICRO: ABNORMAL /LPF
ERYTHROCYTE [DISTWIDTH] IN BLOOD BY AUTOMATED COUNT: 19.7 % (ref 11.5–14.5)
GLUCOSE SERPL-MCNC: 104 MG/DL (ref 65–100)
GLUCOSE UR STRIP.AUTO-MCNC: NEGATIVE MG/DL
HCG UR QL: NEGATIVE
HCT VFR BLD AUTO: 31.7 % (ref 35–47)
HGB BLD-MCNC: 9.3 G/DL (ref 11.5–16)
HGB UR QL STRIP: ABNORMAL
KETONES UR QL STRIP.AUTO: ABNORMAL MG/DL
LEUKOCYTE ESTERASE UR QL STRIP.AUTO: ABNORMAL
MCH RBC QN AUTO: 19.7 PG (ref 26–34)
MCHC RBC AUTO-ENTMCNC: 29.3 G/DL (ref 30–36.5)
MCV RBC AUTO: 67 FL (ref 80–99)
NITRITE UR QL STRIP.AUTO: NEGATIVE
NRBC # BLD: 0 K/UL (ref 0–0.01)
NRBC BLD-RTO: 0 PER 100 WBC
PH UR STRIP: 5.5 [PH] (ref 5–8)
PLATELET # BLD AUTO: 413 K/UL (ref 150–400)
POTASSIUM SERPL-SCNC: 3.9 MMOL/L (ref 3.5–5.1)
PROT UR STRIP-MCNC: 30 MG/DL
RBC # BLD AUTO: 4.73 M/UL (ref 3.8–5.2)
RBC #/AREA URNS HPF: ABNORMAL /HPF (ref 0–5)
SAMPLES BEING HELD,HOLD: NORMAL
SODIUM SERPL-SCNC: 137 MMOL/L (ref 136–145)
SP GR UR REFRACTOMETRY: 1.03 (ref 1–1.03)
UR CULT HOLD, URHOLD: NORMAL
UROBILINOGEN UR QL STRIP.AUTO: 0.2 EU/DL (ref 0.2–1)
WBC # BLD AUTO: 11 K/UL (ref 3.6–11)
WBC URNS QL MICRO: ABNORMAL /HPF (ref 0–4)

## 2022-09-23 PROCEDURE — 99284 EMERGENCY DEPT VISIT MOD MDM: CPT

## 2022-09-23 PROCEDURE — 74011250637 HC RX REV CODE- 250/637: Performed by: PHYSICIAN ASSISTANT

## 2022-09-23 PROCEDURE — 81025 URINE PREGNANCY TEST: CPT

## 2022-09-23 PROCEDURE — 81001 URINALYSIS AUTO W/SCOPE: CPT

## 2022-09-23 PROCEDURE — 74011250636 HC RX REV CODE- 250/636: Performed by: PHYSICIAN ASSISTANT

## 2022-09-23 PROCEDURE — 85027 COMPLETE CBC AUTOMATED: CPT

## 2022-09-23 PROCEDURE — 96374 THER/PROPH/DIAG INJ IV PUSH: CPT

## 2022-09-23 PROCEDURE — 96375 TX/PRO/DX INJ NEW DRUG ADDON: CPT

## 2022-09-23 PROCEDURE — 80048 BASIC METABOLIC PNL TOTAL CA: CPT

## 2022-09-23 PROCEDURE — 36415 COLL VENOUS BLD VENIPUNCTURE: CPT

## 2022-09-23 PROCEDURE — 96361 HYDRATE IV INFUSION ADD-ON: CPT

## 2022-09-23 RX ORDER — DICYCLOMINE HYDROCHLORIDE 10 MG/1
20 CAPSULE ORAL
Status: COMPLETED | OUTPATIENT
Start: 2022-09-23 | End: 2022-09-23

## 2022-09-23 RX ORDER — PROMETHAZINE HYDROCHLORIDE 25 MG/1
25 TABLET ORAL
Qty: 12 TABLET | Refills: 0 | Status: SHIPPED | OUTPATIENT
Start: 2022-09-23

## 2022-09-23 RX ORDER — KETOROLAC TROMETHAMINE 30 MG/ML
15 INJECTION, SOLUTION INTRAMUSCULAR; INTRAVENOUS
Status: COMPLETED | OUTPATIENT
Start: 2022-09-23 | End: 2022-09-23

## 2022-09-23 RX ORDER — PROMETHAZINE HYDROCHLORIDE 25 MG/1
25 TABLET ORAL
Status: COMPLETED | OUTPATIENT
Start: 2022-09-23 | End: 2022-09-23

## 2022-09-23 RX ORDER — CYCLOBENZAPRINE HCL 10 MG
10 TABLET ORAL
Status: COMPLETED | OUTPATIENT
Start: 2022-09-23 | End: 2022-09-23

## 2022-09-23 RX ORDER — DICYCLOMINE HYDROCHLORIDE 20 MG/1
20 TABLET ORAL
Qty: 15 TABLET | Refills: 0 | Status: SHIPPED | OUTPATIENT
Start: 2022-09-23

## 2022-09-23 RX ORDER — ONDANSETRON 2 MG/ML
4 INJECTION INTRAMUSCULAR; INTRAVENOUS
Status: COMPLETED | OUTPATIENT
Start: 2022-09-23 | End: 2022-09-23

## 2022-09-23 RX ADMIN — DICYCLOMINE HYDROCHLORIDE 20 MG: 10 CAPSULE ORAL at 19:01

## 2022-09-23 RX ADMIN — SODIUM CHLORIDE 1000 ML: 9 INJECTION, SOLUTION INTRAVENOUS at 19:01

## 2022-09-23 RX ADMIN — KETOROLAC TROMETHAMINE 15 MG: 30 INJECTION, SOLUTION INTRAMUSCULAR at 19:02

## 2022-09-23 RX ADMIN — ONDANSETRON 4 MG: 2 INJECTION INTRAMUSCULAR; INTRAVENOUS at 19:01

## 2022-09-23 RX ADMIN — CYCLOBENZAPRINE 10 MG: 10 TABLET, FILM COATED ORAL at 20:33

## 2022-09-23 RX ADMIN — PROMETHAZINE HYDROCHLORIDE 25 MG: 25 TABLET ORAL at 20:33

## 2022-09-23 NOTE — ED TRIAGE NOTES
Pt presents to department with a CC of menstrual cramps that are worse than usual which caused her to take a large amount of ibuprofen and reports having vomited after her last 800mg dose at 12pm.

## 2022-09-23 NOTE — ED PROVIDER NOTES
36year old F preesenting for abdominal pain. \"I'm very nauseous. \"  \"I'm cramping very bad. \"  No dysuria. Some frequency. No fever. Pt notes that her menstrual cycle started today, maybe a little early. Notes normal 1st day amount of bleeding. N/V started today with her cycle. Notes that she took ibuprofen earlier. Has vomited x 4 since 11 AM.  No diarrhea. Reports cramps that are like her usual menstrual cramps. States that usually she is able to treat her menstrual cramps with ibuprofen but notes that today she was not able to because of her nausea and vomiting. States that in the past she has been on oral birth control which significantly helped her menstrual cycle symptoms, but notes that she went off of it because she did not like the way that it otherwise made her feel. Denies concern for STI. PMHx: denies  PSx: list UTD  Social: non-smoker. Kassy RUSHING    The history is provided by the patient. Abdominal Cramping   Associated symptoms include nausea and vomiting. Pertinent negatives include no fever and no chest pain. Vomiting   Pertinent negatives include no fever.       Past Medical History:   Diagnosis Date    Abnormal Pap smear of cervix     Headache     LGSIL (low grade squamous intraepithelial dysplasia)     Pap smear for cervical cancer screening     Established with GYN       Past Surgical History:   Procedure Laterality Date    HX GYN  10-    , D&C    HX WISDOM TEETH EXTRACTION           Family History:   Problem Relation Age of Onset    Heart Disease Mother     Diabetes Mother     Hypertension Mother     Hypertension Father     Cancer Father         breast ca    Cancer Maternal Grandmother         breast ca       Social History     Socioeconomic History    Marital status: SINGLE     Spouse name: Not on file    Number of children: Not on file    Years of education: Not on file    Highest education level: Not on file   Occupational History    Occupation: college student Comment: [employer]   Tobacco Use    Smoking status: Never    Smokeless tobacco: Never   Substance and Sexual Activity    Alcohol use: Yes     Comment: social    Drug use: No    Sexual activity: Yes     Partners: Male     Birth control/protection: Condom     Comment: single no children,working at [employer].   Other Topics Concern    Not on file   Social History Narrative    Not on file     Social Determinants of Health     Financial Resource Strain: Not on file   Food Insecurity: Not on file   Transportation Needs: Not on file   Physical Activity: Not on file   Stress: Not on file   Social Connections: Not on file   Intimate Partner Violence: Not on file   Housing Stability: Not on file         ALLERGIES: Patient has no known allergies. Review of Systems   Constitutional:  Negative for fever. HENT:  Negative for facial swelling. Respiratory:  Negative for shortness of breath. Cardiovascular:  Negative for chest pain. Gastrointestinal:  Positive for nausea and vomiting. Genitourinary:  Positive for pelvic pain and vaginal bleeding. Musculoskeletal:  Negative for neck stiffness. Skin:  Negative for wound. Neurological:  Negative for syncope. All other systems reviewed and are negative. Vitals:    09/23/22 1719   BP: (!) 155/104   Pulse: 85   Resp: 16   Temp: 97.6 °F (36.4 °C)   SpO2: 94%            Physical Exam  Vitals and nursing note reviewed. Constitutional:       General: She is not in acute distress. Appearance: She is well-developed. Comments: Pleasant, appears to not feel well, no distress   HENT:      Head: Normocephalic and atraumatic. Right Ear: External ear normal.      Left Ear: External ear normal.   Eyes:      General: No scleral icterus. Conjunctiva/sclera: Conjunctivae normal.   Neck:      Trachea: No tracheal deviation. Cardiovascular:      Rate and Rhythm: Normal rate and regular rhythm. Heart sounds: Normal heart sounds.  No murmur heard.    No friction rub. No gallop. Pulmonary:      Effort: Pulmonary effort is normal. No respiratory distress. Breath sounds: Normal breath sounds. No stridor. No wheezing. Abdominal:      General: There is no distension. Palpations: Abdomen is soft. Tenderness: There is no abdominal tenderness. Musculoskeletal:         General: Normal range of motion. Cervical back: Neck supple. Skin:     General: Skin is warm and dry. Neurological:      Mental Status: She is alert and oriented to person, place, and time. Psychiatric:         Behavior: Behavior normal.        MDM  Number of Diagnoses or Management Options  Dehydration  Dysmenorrhea  Nausea and vomiting, unspecified vomiting type  Diagnosis management comments: 53-year-old female presenting to the ED for pelvic pain/cramping as well as nausea/vomiting onset with her menstrual cycle today. Afebrile. Differential diagnosis is broad, includes dysmenorrhea, viral illness, cystitis, pregnancy, ectopic pregnancy, etc.  No focal abdominal tenderness concerning for appendicitis or other intra-abdominal etiology. Will check labs, treat symptoms, reassess. Patient feeling better after medications in the ED, on repeat exam abdomen is soft and nontender. Discussed use of medications at home for symptomatic and supportive care, follow-up with GYN, return precautions given.        Amount and/or Complexity of Data Reviewed  Clinical lab tests: ordered and reviewed  Tests in the radiology section of CPT®: ordered and reviewed  Discuss the patient with other providers: yes (Dr. Betito Fernandez ED attending)           Procedures

## 2022-09-23 NOTE — LETTER
[address]  [address] [facility] [address]  [phone]    Work/School Note    Date: 9/23/2022    To Whom It May concern:    Clyde Lee was seen and treated today in the emergency room by the following provider(s):  Attending Provider: Millie Cedillo MD  Physician Assistant: URIEL Cadena. Clyde Lee is excused from work/school on 09/23/22 and 09/24/22. She is medically clear to return to work/school on 9/25/2022.        Sincerely,          URIEL Mulligan

## 2022-09-24 NOTE — DISCHARGE INSTRUCTIONS
Return for new or worsening symptoms. Your blood work tonight was reassuring including your white blood cell count and kidney function. Your urine test did not show any signs of a bladder or kidney infection. You have been written a medicine that may help with your cramping and also something for nausea.   Make a follow-up appointment with your gynecologist.

## 2023-06-26 SDOH — ECONOMIC STABILITY: FOOD INSECURITY: WITHIN THE PAST 12 MONTHS, YOU WORRIED THAT YOUR FOOD WOULD RUN OUT BEFORE YOU GOT MONEY TO BUY MORE.: SOMETIMES TRUE

## 2023-06-26 SDOH — ECONOMIC STABILITY: FOOD INSECURITY: WITHIN THE PAST 12 MONTHS, THE FOOD YOU BOUGHT JUST DIDN'T LAST AND YOU DIDN'T HAVE MONEY TO GET MORE.: SOMETIMES TRUE

## 2023-06-26 SDOH — ECONOMIC STABILITY: HOUSING INSECURITY
IN THE LAST 12 MONTHS, WAS THERE A TIME WHEN YOU DID NOT HAVE A STEADY PLACE TO SLEEP OR SLEPT IN A SHELTER (INCLUDING NOW)?: NO

## 2023-06-26 SDOH — ECONOMIC STABILITY: INCOME INSECURITY: HOW HARD IS IT FOR YOU TO PAY FOR THE VERY BASICS LIKE FOOD, HOUSING, MEDICAL CARE, AND HEATING?: SOMEWHAT HARD

## 2023-06-26 SDOH — ECONOMIC STABILITY: TRANSPORTATION INSECURITY
IN THE PAST 12 MONTHS, HAS LACK OF TRANSPORTATION KEPT YOU FROM MEETINGS, WORK, OR FROM GETTING THINGS NEEDED FOR DAILY LIVING?: NO

## 2023-06-27 ENCOUNTER — TELEMEDICINE (OUTPATIENT)
Age: 33
End: 2023-06-27
Payer: COMMERCIAL

## 2023-06-27 DIAGNOSIS — M54.6 CHRONIC BILATERAL THORACIC BACK PAIN: Primary | ICD-10-CM

## 2023-06-27 DIAGNOSIS — G89.29 CHRONIC BILATERAL THORACIC BACK PAIN: Primary | ICD-10-CM

## 2023-06-27 DIAGNOSIS — N62 LARGE BREASTS: ICD-10-CM

## 2023-06-27 DIAGNOSIS — M54.2 NECK PAIN: ICD-10-CM

## 2023-06-27 DIAGNOSIS — M62.838 MUSCLE SPASM: ICD-10-CM

## 2023-06-27 DIAGNOSIS — G43.809 OTHER MIGRAINE WITHOUT STATUS MIGRAINOSUS, NOT INTRACTABLE: ICD-10-CM

## 2023-06-27 PROCEDURE — 99214 OFFICE O/P EST MOD 30 MIN: CPT | Performed by: INTERNAL MEDICINE

## 2023-06-27 RX ORDER — RIZATRIPTAN BENZOATE 10 MG/1
TABLET ORAL
Qty: 10 TABLET | Refills: 0 | Status: SHIPPED | OUTPATIENT
Start: 2023-06-27 | End: 2023-06-28 | Stop reason: SDUPTHER

## 2023-06-27 RX ORDER — ONDANSETRON 4 MG/1
TABLET, ORALLY DISINTEGRATING ORAL
COMMUNITY
Start: 2023-06-14

## 2023-06-27 RX ORDER — ETODOLAC 400 MG/1
400 TABLET, FILM COATED ORAL 2 TIMES DAILY
COMMUNITY
Start: 2023-06-14

## 2023-06-27 RX ORDER — BACLOFEN 10 MG/1
10 TABLET ORAL 2 TIMES DAILY PRN
Qty: 2 TABLET | Refills: 0 | Status: SHIPPED | OUTPATIENT
Start: 2023-06-27

## 2023-06-27 RX ORDER — SUMATRIPTAN 50 MG/1
TABLET, FILM COATED ORAL
COMMUNITY
Start: 2023-06-14 | End: 2023-06-27 | Stop reason: ALTCHOICE

## 2023-06-27 ASSESSMENT — PATIENT HEALTH QUESTIONNAIRE - PHQ9
SUM OF ALL RESPONSES TO PHQ QUESTIONS 1-9: 0
1. LITTLE INTEREST OR PLEASURE IN DOING THINGS: 0
SUM OF ALL RESPONSES TO PHQ QUESTIONS 1-9: 0
2. FEELING DOWN, DEPRESSED OR HOPELESS: 0
SUM OF ALL RESPONSES TO PHQ9 QUESTIONS 1 & 2: 0

## 2023-06-28 DIAGNOSIS — G43.809 OTHER MIGRAINE WITHOUT STATUS MIGRAINOSUS, NOT INTRACTABLE: ICD-10-CM

## 2023-06-28 RX ORDER — RIZATRIPTAN BENZOATE 10 MG/1
TABLET ORAL
Qty: 10 TABLET | Refills: 0 | Status: SHIPPED | OUTPATIENT
Start: 2023-06-28

## 2023-09-11 ENCOUNTER — OFFICE VISIT (OUTPATIENT)
Age: 33
End: 2023-09-11
Payer: COMMERCIAL

## 2023-09-11 VITALS
DIASTOLIC BLOOD PRESSURE: 74 MMHG | HEART RATE: 101 BPM | TEMPERATURE: 96.8 F | OXYGEN SATURATION: 99 % | RESPIRATION RATE: 16 BRPM | HEIGHT: 66 IN | SYSTOLIC BLOOD PRESSURE: 124 MMHG | WEIGHT: 251.2 LBS | BODY MASS INDEX: 40.37 KG/M2

## 2023-09-11 DIAGNOSIS — L21.9 SEBORRHEA: ICD-10-CM

## 2023-09-11 DIAGNOSIS — N62 LARGE BREASTS: ICD-10-CM

## 2023-09-11 DIAGNOSIS — N92.0 SPOTTING: ICD-10-CM

## 2023-09-11 DIAGNOSIS — R10.13 DYSPEPSIA: ICD-10-CM

## 2023-09-11 DIAGNOSIS — G43.809 OTHER MIGRAINE WITHOUT STATUS MIGRAINOSUS, NOT INTRACTABLE: ICD-10-CM

## 2023-09-11 DIAGNOSIS — Z11.59 NEED FOR HEPATITIS C SCREENING TEST: ICD-10-CM

## 2023-09-11 DIAGNOSIS — D64.9 ANEMIA, UNSPECIFIED TYPE: ICD-10-CM

## 2023-09-11 DIAGNOSIS — M54.6 CHRONIC BILATERAL THORACIC BACK PAIN: ICD-10-CM

## 2023-09-11 DIAGNOSIS — G89.29 CHRONIC BILATERAL THORACIC BACK PAIN: ICD-10-CM

## 2023-09-11 DIAGNOSIS — E55.9 VITAMIN D DEFICIENCY: ICD-10-CM

## 2023-09-11 DIAGNOSIS — Z00.00 ROUTINE GENERAL MEDICAL EXAMINATION AT A HEALTH CARE FACILITY: Primary | ICD-10-CM

## 2023-09-11 PROCEDURE — 99395 PREV VISIT EST AGE 18-39: CPT | Performed by: INTERNAL MEDICINE

## 2023-09-11 RX ORDER — KETOCONAZOLE 20 MG/ML
SHAMPOO TOPICAL
Qty: 1 EACH | Refills: 1 | Status: SHIPPED | OUTPATIENT
Start: 2023-09-11

## 2023-09-11 RX ORDER — RIZATRIPTAN BENZOATE 10 MG/1
10 TABLET ORAL DAILY PRN
Qty: 12 TABLET | Refills: 3 | Status: SHIPPED | OUTPATIENT
Start: 2023-09-11

## 2023-09-11 RX ORDER — PANTOPRAZOLE SODIUM 40 MG/1
40 TABLET, DELAYED RELEASE ORAL
Qty: 90 TABLET | Refills: 1 | Status: SHIPPED | OUTPATIENT
Start: 2023-09-11

## 2023-09-11 SDOH — ECONOMIC STABILITY: INCOME INSECURITY: HOW HARD IS IT FOR YOU TO PAY FOR THE VERY BASICS LIKE FOOD, HOUSING, MEDICAL CARE, AND HEATING?: NOT VERY HARD

## 2023-09-11 SDOH — ECONOMIC STABILITY: FOOD INSECURITY: WITHIN THE PAST 12 MONTHS, THE FOOD YOU BOUGHT JUST DIDN'T LAST AND YOU DIDN'T HAVE MONEY TO GET MORE.: NEVER TRUE

## 2023-09-11 SDOH — ECONOMIC STABILITY: FOOD INSECURITY: WITHIN THE PAST 12 MONTHS, YOU WORRIED THAT YOUR FOOD WOULD RUN OUT BEFORE YOU GOT MONEY TO BUY MORE.: NEVER TRUE

## 2023-09-11 ASSESSMENT — ANXIETY QUESTIONNAIRES
1. FEELING NERVOUS, ANXIOUS, OR ON EDGE: 0
2. NOT BEING ABLE TO STOP OR CONTROL WORRYING: 0
4. TROUBLE RELAXING: 0
3. WORRYING TOO MUCH ABOUT DIFFERENT THINGS: 0
7. FEELING AFRAID AS IF SOMETHING AWFUL MIGHT HAPPEN: 0
5. BEING SO RESTLESS THAT IT IS HARD TO SIT STILL: 0
6. BECOMING EASILY ANNOYED OR IRRITABLE: 0
GAD7 TOTAL SCORE: 0
IF YOU CHECKED OFF ANY PROBLEMS ON THIS QUESTIONNAIRE, HOW DIFFICULT HAVE THESE PROBLEMS MADE IT FOR YOU TO DO YOUR WORK, TAKE CARE OF THINGS AT HOME, OR GET ALONG WITH OTHER PEOPLE: NOT DIFFICULT AT ALL

## 2023-09-11 ASSESSMENT — ENCOUNTER SYMPTOMS
BACK PAIN: 1
GASTROINTESTINAL NEGATIVE: 1
EYES NEGATIVE: 1
RESPIRATORY NEGATIVE: 1
ALLERGIC/IMMUNOLOGIC NEGATIVE: 1

## 2023-09-11 ASSESSMENT — PATIENT HEALTH QUESTIONNAIRE - PHQ9
1. LITTLE INTEREST OR PLEASURE IN DOING THINGS: 0
SUM OF ALL RESPONSES TO PHQ QUESTIONS 1-9: 0
2. FEELING DOWN, DEPRESSED OR HOPELESS: 0
SUM OF ALL RESPONSES TO PHQ QUESTIONS 1-9: 0
SUM OF ALL RESPONSES TO PHQ9 QUESTIONS 1 & 2: 0

## 2023-09-11 NOTE — PROGRESS NOTES
1. \"Have you been to the ER, urgent care clinic since your last visit? Hospitalized since your last visit? \" No    2. \"Have you seen or consulted any other health care providers outside of the [address] [place] [address] since your last visit? \" No    3. For patients aged 43-73: Has the patient had a colonoscopy / FIT/ Cologuard? Recommendation: Colonoscopy every 10y or annual FIT test from 50-75 or every 3 year stool DNA based test with consideration of ongoing screening from 76-85. If the patient is female:    4. For patients aged 43-66: Has the patient had a mammogram within the past 2 years? No    5. For patients aged 21-65: Has the patient had a pap smear?  No
started on Depo-Provera but gynecologist saying that if she might have endometriosis. She will follow-up with her. Will order,  -     CBC with Auto Differential  -     Iron and TIBC  -     Ferritin  Spotting  Vitamin D deficiency  -     Vitamin D 25 Hydroxy  Need for hepatitis C screening test  -     Hepatitis C Antibody  Seborrhea    We will order,  -     ketoconazole (NIZORAL) 2 % shampoo; Apply topically daily as needed. Dyspepsia    Avoid spicy food. We will refill,  -     pantoprazole (PROTONIX) 40 MG tablet; Take 1 tablet by mouth every morning (before breakfast)     Discussed expected course/resolution/complications of diagnosis in detail with patient. Medication risks/benefits/costs/interactions/alternatives discussed with patient. Pt was given an after visit summary which includes diagnoses, current medications & vitals. Pt expressed understanding with the diagnosis and plan.

## 2023-10-03 LAB
ALBUMIN SERPL-MCNC: 3.4 G/DL (ref 3.5–5)
ALBUMIN/GLOB SERPL: 0.9 (ref 1.1–2.2)
ALP SERPL-CCNC: 65 U/L (ref 45–117)
ALT SERPL-CCNC: 14 U/L (ref 12–78)
ANION GAP SERPL CALC-SCNC: 3 MMOL/L (ref 5–15)
APPEARANCE UR: ABNORMAL
AST SERPL-CCNC: 9 U/L (ref 15–37)
BACTERIA URNS QL MICRO: NEGATIVE /HPF
BASOPHILS # BLD: 0.1 K/UL (ref 0–0.1)
BASOPHILS NFR BLD: 1 % (ref 0–1)
BILIRUB SERPL-MCNC: 0.4 MG/DL (ref 0.2–1)
BILIRUB UR QL: NEGATIVE
BUN SERPL-MCNC: 17 MG/DL (ref 6–20)
BUN/CREAT SERPL: 18 (ref 12–20)
CALCIUM SERPL-MCNC: 8.9 MG/DL (ref 8.5–10.1)
CHLORIDE SERPL-SCNC: 112 MMOL/L (ref 97–108)
CHOLEST SERPL-MCNC: 155 MG/DL
CO2 SERPL-SCNC: 23 MMOL/L (ref 21–32)
COLOR UR: ABNORMAL
CREAT SERPL-MCNC: 0.93 MG/DL (ref 0.55–1.02)
DIFFERENTIAL METHOD BLD: ABNORMAL
EOSINOPHIL # BLD: 0 K/UL (ref 0–0.4)
EOSINOPHIL NFR BLD: 1 % (ref 0–7)
EPITH CASTS URNS QL MICRO: ABNORMAL /LPF
ERYTHROCYTE [DISTWIDTH] IN BLOOD BY AUTOMATED COUNT: 19.8 % (ref 11.5–14.5)
FERRITIN SERPL-MCNC: 4 NG/ML (ref 8–252)
GLOBULIN SER CALC-MCNC: 4 G/DL (ref 2–4)
GLUCOSE SERPL-MCNC: 106 MG/DL (ref 65–100)
GLUCOSE UR STRIP.AUTO-MCNC: NEGATIVE MG/DL
HCT VFR BLD AUTO: 32.7 % (ref 35–47)
HCV AB SER IA-ACNC: 0.12 INDEX
HCV AB SERPL QL IA: NONREACTIVE
HDLC SERPL-MCNC: 41 MG/DL
HDLC SERPL: 3.8 (ref 0–5)
HGB BLD-MCNC: 9.6 G/DL (ref 11.5–16)
HGB UR QL STRIP: NEGATIVE
HYALINE CASTS URNS QL MICRO: ABNORMAL /LPF (ref 0–5)
IMM GRANULOCYTES # BLD AUTO: 0 K/UL (ref 0–0.04)
IMM GRANULOCYTES NFR BLD AUTO: 0 % (ref 0–0.5)
KETONES UR QL STRIP.AUTO: ABNORMAL MG/DL
LDLC SERPL CALC-MCNC: 105.2 MG/DL (ref 0–100)
LEUKOCYTE ESTERASE UR QL STRIP.AUTO: NEGATIVE
LYMPHOCYTES # BLD: 2.3 K/UL (ref 0.8–3.5)
LYMPHOCYTES NFR BLD: 41 % (ref 12–49)
MCH RBC QN AUTO: 21.3 PG (ref 26–34)
MCHC RBC AUTO-ENTMCNC: 29.4 G/DL (ref 30–36.5)
MCV RBC AUTO: 72.7 FL (ref 80–99)
MONOCYTES # BLD: 0.6 K/UL (ref 0–1)
MONOCYTES NFR BLD: 10 % (ref 5–13)
NEUTS SEG # BLD: 2.6 K/UL (ref 1.8–8)
NEUTS SEG NFR BLD: 47 % (ref 32–75)
NITRITE UR QL STRIP.AUTO: NEGATIVE
NRBC # BLD: 0 K/UL (ref 0–0.01)
NRBC BLD-RTO: 0 PER 100 WBC
PH UR STRIP: 5.5 (ref 5–8)
PLATELET # BLD AUTO: 339 K/UL (ref 150–400)
POTASSIUM SERPL-SCNC: 4.2 MMOL/L (ref 3.5–5.1)
PROT SERPL-MCNC: 7.4 G/DL (ref 6.4–8.2)
PROT UR STRIP-MCNC: NEGATIVE MG/DL
RBC # BLD AUTO: 4.5 M/UL (ref 3.8–5.2)
RBC #/AREA URNS HPF: ABNORMAL /HPF (ref 0–5)
SODIUM SERPL-SCNC: 138 MMOL/L (ref 136–145)
SP GR UR REFRACTOMETRY: 1.03 (ref 1–1.03)
TRIGL SERPL-MCNC: 44 MG/DL
TSH SERPL DL<=0.05 MIU/L-ACNC: 1.01 UIU/ML (ref 0.36–3.74)
UROBILINOGEN UR QL STRIP.AUTO: 0.2 EU/DL (ref 0.2–1)
VLDLC SERPL CALC-MCNC: 8.8 MG/DL
WBC # BLD AUTO: 5.5 K/UL (ref 3.6–11)
WBC URNS QL MICRO: ABNORMAL /HPF (ref 0–4)

## 2023-10-04 LAB — 25(OH)D3+25(OH)D2 SERPL-MCNC: 10.1 NG/ML (ref 30–100)

## 2023-10-05 LAB
IRON SATN MFR SERPL: 5 % (ref 15–55)
IRON SERPL-MCNC: 21 UG/DL (ref 27–159)
TIBC SERPL-MCNC: 382 UG/DL (ref 250–450)
UIBC SERPL-MCNC: 361 UG/DL (ref 131–425)

## 2023-10-09 RX ORDER — ERGOCALCIFEROL 1.25 MG/1
50000 CAPSULE ORAL WEEKLY
Qty: 12 CAPSULE | Refills: 1 | Status: SHIPPED | OUTPATIENT
Start: 2023-10-09

## 2023-10-09 RX ORDER — FERROUS SULFATE 325(65) MG
325 TABLET ORAL EVERY OTHER DAY
Qty: 45 TABLET | Refills: 1 | Status: SHIPPED | OUTPATIENT
Start: 2023-10-09 | End: 2023-10-23 | Stop reason: SDUPTHER

## 2023-10-10 ENCOUNTER — TELEPHONE (OUTPATIENT)
Age: 33
End: 2023-10-10

## 2023-10-10 NOTE — TELEPHONE ENCOUNTER
----- Message from TRESSA Macario CNP sent at 10/9/2023  1:54 PM EDT -----  Vitamin D is deficient, I will send in ergocalciferol. She has iron deficiency anemia, I will send in ferrous that she can take every other day (this decreases GI effects). Also should ensure that she is getting at least 70 ounces of water per day. She should follow up for repeat CBC, Vitamin D, iron profile in 3 months. Also please educate on iron rich diet such as green leafy vegetables, meats, and beans.

## 2023-10-23 DIAGNOSIS — R10.13 DYSPEPSIA: ICD-10-CM

## 2023-10-23 DIAGNOSIS — D50.9 IRON DEFICIENCY ANEMIA, UNSPECIFIED IRON DEFICIENCY ANEMIA TYPE: Primary | ICD-10-CM

## 2023-10-23 DIAGNOSIS — G43.809 OTHER MIGRAINE WITHOUT STATUS MIGRAINOSUS, NOT INTRACTABLE: ICD-10-CM

## 2023-10-23 DIAGNOSIS — E55.9 VITAMIN D DEFICIENCY: ICD-10-CM

## 2023-10-23 RX ORDER — FERROUS SULFATE 325(65) MG
325 TABLET ORAL EVERY OTHER DAY
Qty: 45 TABLET | Refills: 1 | Status: SHIPPED | OUTPATIENT
Start: 2023-10-23 | End: 2024-04-20

## 2023-10-23 RX ORDER — RIZATRIPTAN BENZOATE 10 MG/1
10 TABLET ORAL DAILY PRN
Qty: 12 TABLET | Refills: 3 | Status: SHIPPED | OUTPATIENT
Start: 2023-10-23

## 2023-10-23 RX ORDER — PANTOPRAZOLE SODIUM 40 MG/1
40 TABLET, DELAYED RELEASE ORAL
Qty: 90 TABLET | Refills: 1 | Status: SHIPPED | OUTPATIENT
Start: 2023-10-23

## 2023-10-23 RX ORDER — ERGOCALCIFEROL 1.25 MG/1
50000 CAPSULE ORAL WEEKLY
Qty: 12 CAPSULE | Refills: 1 | OUTPATIENT
Start: 2023-10-23

## 2023-10-23 NOTE — TELEPHONE ENCOUNTER
ECC:     Pt wants to change the pharmacy she's using for her medications. Patient could not confirm the name of the medications; only what they are used for. Please return call to verify.    Phone: [phone]

## 2023-10-23 NOTE — TELEPHONE ENCOUNTER
Jyoti Host was called and verbalized understanding on note below.        Requested Prescriptions     Pending Prescriptions Disp Refills    rizatriptan (MAXALT) 10 MG tablet 12 tablet 3     Sig: Take 1 tablet by mouth daily as needed for Migraine May repeat in 2 hours if needed    vitamin D (ERGOCALCIFEROL) 1.25 MG (70102 UT) CAPS capsule 12 capsule 1     Sig: Take 1 capsule by mouth once a week    pantoprazole (PROTONIX) 40 MG tablet 90 tablet 1     Sig: Take 1 tablet by mouth every morning (before breakfast)    ferrous sulfate (IRON 325) 325 (65 Fe) MG tablet 45 tablet 1     Sig: Take 1 tablet by mouth every other day With food         [facility] [address] [facility], [address] [facility] [phone] - F [phone]  [address] [facility] [address]  Phone: [phone] Fax: [phone]       Last appt 9/11/2023      Future Appointments   Date Time Provider [address] [place] [address] [address]   12/14/2023  1:15 PM Felix Ramos MD NEREIDA BS [facility]

## 2024-02-02 ENCOUNTER — TELEMEDICINE (OUTPATIENT)
Age: 34
End: 2024-02-02
Payer: COMMERCIAL

## 2024-02-02 DIAGNOSIS — G47.00 INSOMNIA, UNSPECIFIED TYPE: ICD-10-CM

## 2024-02-02 DIAGNOSIS — F41.9 ANXIETY: Primary | ICD-10-CM

## 2024-02-02 DIAGNOSIS — G43.809 OTHER MIGRAINE WITHOUT STATUS MIGRAINOSUS, NOT INTRACTABLE: ICD-10-CM

## 2024-02-02 DIAGNOSIS — D50.9 IRON DEFICIENCY ANEMIA, UNSPECIFIED IRON DEFICIENCY ANEMIA TYPE: ICD-10-CM

## 2024-02-02 PROCEDURE — 99214 OFFICE O/P EST MOD 30 MIN: CPT | Performed by: INTERNAL MEDICINE

## 2024-02-02 RX ORDER — FERROUS SULFATE 325(65) MG
325 TABLET ORAL EVERY OTHER DAY
Qty: 45 TABLET | Refills: 1 | Status: SHIPPED | OUTPATIENT
Start: 2024-02-02 | End: 2024-07-31

## 2024-02-02 RX ORDER — HYDROXYZINE HYDROCHLORIDE 10 MG/1
TABLET, FILM COATED ORAL
Qty: 45 TABLET | Refills: 1 | Status: SHIPPED | OUTPATIENT
Start: 2024-02-02

## 2024-02-02 RX ORDER — RIZATRIPTAN BENZOATE 10 MG/1
10 TABLET ORAL DAILY PRN
Qty: 12 TABLET | Refills: 3 | Status: SHIPPED | OUTPATIENT
Start: 2024-02-02

## 2024-02-02 ASSESSMENT — PATIENT HEALTH QUESTIONNAIRE - PHQ9
SUM OF ALL RESPONSES TO PHQ9 QUESTIONS 1 & 2: 0
SUM OF ALL RESPONSES TO PHQ QUESTIONS 1-9: 0
2. FEELING DOWN, DEPRESSED OR HOPELESS: 0
1. LITTLE INTEREST OR PLEASURE IN DOING THINGS: 0

## 2024-02-02 NOTE — PROGRESS NOTES
Leslie Briceño, was evaluated through a synchronous (real-time) audio-video encounter. The patient (or guardian if applicable) is aware that this is a billable service, which includes applicable co-pays. This Virtual Visit was conducted with patient's (and/or legal guardian's) consent. Patient identification was verified, and a caregiver was present when appropriate.   The patient was located at Home: [address]  [place] [address]  Provider was located at Facility (Appt Dept): [address]  [address]  [place] [address]      Leslie Briceño (:  1990) is a Established patient, presenting virtually for evaluation of the following:    Assessment & Plan   Below is the assessment and plan developed based on review of pertinent history, physical exam, labs, studies, and medications.  1. Anxiety    She has a lot of issues.  She is apparent and sibling are not doing well.  As she is taking care of all of them.  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  She is unable to sleep well at night.  A lot of anxiety and stress.  Advised her to drink exercise and try to calm down and medicate.  Will give,  -     hydrOXYzine HCl (ATARAX) 10 MG tablet; 1 -2 tab po HS PRN, Disp-45 tablet, R-1Normal  2. Insomnia, unspecified type  Sleep Tips    What to avoid    Do not have drinks with caffeine, such as coffee or black tea, for 8 hours before bed.  Do not smoke or use other types of tobacco near bedtime. Nicotine is a stimulant and can keep you awake.  Avoid drinking alcohol late in the evening, because it can cause you to wake in the middle of the night.  Do not eat a big meal close to bedtime. If you are hungry, eat a light snack.  Do not drink a lot of water close to bedtime, because the need to urinate may wake you up during the night.  Do not read or watch TV in bed. Use the bed only for sleeping and sexual activity.  What to try    Go to bed at the same time every night, and wake up at the same time

## 2024-04-09 ENCOUNTER — TELEPHONE (OUTPATIENT)
Age: 34
End: 2024-04-09

## 2024-04-09 NOTE — TELEPHONE ENCOUNTER
----- Message from Qi Tabor sent at 4/9/2024 10:27 AM EDT -----  Subject: Message to Provider    QUESTIONS  Information for Provider? Patient cx appt this week for f/u and needs   another appt in office soon. Also does she need blood work done? call her   back   ---------------------------------------------------------------------------  --------------  CALL BACK INFO  [phone]; OK to leave message on voicemail  ---------------------------------------------------------------------------  --------------  SCRIPT ANSWERS  Relationship to Patient? Self

## 2024-06-10 NOTE — PROGRESS NOTES
[facility] [place]  [facility] of [facility]  Genetic Counseling   Phone: [phone]  Fax: [phone]    Date of Visit: 2024  Patient Name: Leslie Briceño  YOB: 1990  Referring Provider: None (self-referral)      HISTORY OF PRESENT ILLNESS  Leslie Briceño is a 33 y.o. female with no personal cancer history who presents today for genetic counseling due to her family history of cancer. We met to review this history and determine whether genetic testing for hereditary susceptibility to disease may be indicated for the patient. Ms. Briceño reports that she is planning to have children in the future and would like to understand if she is at risk of passing on a genetic mutation to any future children.   The appointment was conducted in person and Ms. Briceño was unaccompanied to the appointment. .    Medical History  Cancer History:  none    Gynecologic History:  Onset of menses at age 14y. Nulliparous. OCPs used for approximately <1 year. Currently uses depo shot.   Ovaries and uterus intact    Cancer Screening History:  Breast cancer screening  Not initiated  Gynecological cancer screening  Last pap 2023   Colon cancer screening  Not initiated    Social History     Tobacco Use    Smoking status: Never    Smokeless tobacco: Never   Substance Use Topics    Alcohol use: Not Currently       Family History  A three-generation pedigree was collected at the time of the appointment based on patient report in the absence of complete medical records. A full pedigree is available in the electronic medical record for additional details.  No consanguinity reported in either maternal or paternal family.  No Ashkenazi Bahai ancestry reported.     Children:  none  Siblings:  Sister, 28, no cancer history  Maternal History:  Grandmother,  58 due to breast cancer diagnosed at 52  Paternal History:  Father, 57, male breast cancer diagnosed at 50, no genetic

## 2024-06-12 ENCOUNTER — INITIAL CONSULT (OUTPATIENT)
Age: 34
End: 2024-06-12

## 2024-06-12 DIAGNOSIS — Z80.3 FAMILY HISTORY OF BREAST CANCER: Primary | ICD-10-CM

## 2024-07-10 ENCOUNTER — OFFICE VISIT (OUTPATIENT)
Age: 34
End: 2024-07-10

## 2024-07-10 DIAGNOSIS — Z13.71 BRCA2 NEGATIVE: ICD-10-CM

## 2024-07-10 DIAGNOSIS — Z71.83 ENCOUNTER FOR NONPROCREATIVE GENETIC COUNSELING: Primary | ICD-10-CM

## 2024-07-10 DIAGNOSIS — Z13.71 BRCA1 NEGATIVE: ICD-10-CM

## 2024-07-10 NOTE — PROGRESS NOTES
[facility] [place] [facility] [place]  A [facility] of [facility]  Genetic Counseling   [address] [place] [id number]  Phone: [phone]  Fax: [phone]    Date: 7/10/2024  Patient Name: Leslie Briceño  YOB: 1990  Referring Provider: None (self-referral)     INDICATION: Disclosure of germline genetic test results by phone.    INTERVAL HISTORY: Leslie Briceño is a 33 y.o. female who recently underwent germline genetic testing due to her family history of cancer which was significant for breast cancer in her father, paternal grandmother, and a paternal aunt. I spoke with Ms. Briceño by phone today to review the results of her genetic testing and their implications.    TEST RESULTS: NEGATIVE, no pathogenic variant identified  Testing performed: BRCANext-Expanded  Laboratory: [facility]  Genes Analyzed (21 total): LINA, BARD1, BRCA1, BRCA2, BRIP1, CDH1, CHEK2, DICER1, MLH1, MSH2, MSH6, NF1, PALB2, PMS2, PTEN, RAD51C, RAD51D, SMARCA4, STK11 and TP53 (sequencing and deletion/duplication); EPCAM (deletion/duplication only).  A full copy of results is available in the patient's record for additional details.     INTERPRETATION & DISCUSSION:  Genetic testing did not identify any genetic mutations associated with a hereditary cancer syndrome. This test cannot exclude the possibility of a mutation in this patient in a gene which was not included in her analysis. The patient may check in with our clinic and/or her other healthcare providers periodically to determine if there have been any updates to our knowledge on genes associated with inherited cancer predisposition syndromes.  The patient's cancer risk is not expected to be increased above the general population risk based on her genetic testing results. An individual's cancer risk and medical management are not determined by genetic test results alone. The patient should continue to follow the cancer screening

## 2025-02-07 ENCOUNTER — OFFICE VISIT (OUTPATIENT)
Age: 35
End: 2025-02-07
Payer: COMMERCIAL

## 2025-02-07 VITALS
SYSTOLIC BLOOD PRESSURE: 135 MMHG | RESPIRATION RATE: 17 BRPM | TEMPERATURE: 98.8 F | HEART RATE: 79 BPM | OXYGEN SATURATION: 96 % | BODY MASS INDEX: 45.42 KG/M2 | DIASTOLIC BLOOD PRESSURE: 89 MMHG | HEIGHT: 66 IN | WEIGHT: 282.6 LBS

## 2025-02-07 DIAGNOSIS — N93.9 ABNORMAL UTERINE BLEEDING (AUB): ICD-10-CM

## 2025-02-07 DIAGNOSIS — Z01.419 ENCOUNTER FOR GYNECOLOGICAL EXAMINATION: Primary | ICD-10-CM

## 2025-02-07 DIAGNOSIS — Z11.3 SCREENING EXAMINATION FOR STD (SEXUALLY TRANSMITTED DISEASE): ICD-10-CM

## 2025-02-07 DIAGNOSIS — R10.2 PELVIC PAIN: ICD-10-CM

## 2025-02-07 PROCEDURE — 99385 PREV VISIT NEW AGE 18-39: CPT | Performed by: OBSTETRICS & GYNECOLOGY

## 2025-02-07 SDOH — ECONOMIC STABILITY: FOOD INSECURITY: WITHIN THE PAST 12 MONTHS, THE FOOD YOU BOUGHT JUST DIDN'T LAST AND YOU DIDN'T HAVE MONEY TO GET MORE.: NEVER TRUE

## 2025-02-07 SDOH — ECONOMIC STABILITY: FOOD INSECURITY: WITHIN THE PAST 12 MONTHS, YOU WORRIED THAT YOUR FOOD WOULD RUN OUT BEFORE YOU GOT MONEY TO BUY MORE.: NEVER TRUE

## 2025-02-07 ASSESSMENT — PATIENT HEALTH QUESTIONNAIRE - PHQ9
8. MOVING OR SPEAKING SO SLOWLY THAT OTHER PEOPLE COULD HAVE NOTICED. OR THE OPPOSITE, BEING SO FIGETY OR RESTLESS THAT YOU HAVE BEEN MOVING AROUND A LOT MORE THAN USUAL: NOT AT ALL
SUM OF ALL RESPONSES TO PHQ QUESTIONS 1-9: 2
SUM OF ALL RESPONSES TO PHQ QUESTIONS 1-9: 2
7. TROUBLE CONCENTRATING ON THINGS, SUCH AS READING THE NEWSPAPER OR WATCHING TELEVISION: NOT AT ALL
3. TROUBLE FALLING OR STAYING ASLEEP: SEVERAL DAYS
2. FEELING DOWN, DEPRESSED OR HOPELESS: NOT AT ALL
5. POOR APPETITE OR OVEREATING: NOT AT ALL
9. THOUGHTS THAT YOU WOULD BE BETTER OFF DEAD, OR OF HURTING YOURSELF: NOT AT ALL
6. FEELING BAD ABOUT YOURSELF - OR THAT YOU ARE A FAILURE OR HAVE LET YOURSELF OR YOUR FAMILY DOWN: NOT AT ALL
10. IF YOU CHECKED OFF ANY PROBLEMS, HOW DIFFICULT HAVE THESE PROBLEMS MADE IT FOR YOU TO DO YOUR WORK, TAKE CARE OF THINGS AT HOME, OR GET ALONG WITH OTHER PEOPLE: SOMEWHAT DIFFICULT
4. FEELING TIRED OR HAVING LITTLE ENERGY: SEVERAL DAYS
1. LITTLE INTEREST OR PLEASURE IN DOING THINGS: NOT AT ALL
SUM OF ALL RESPONSES TO PHQ QUESTIONS 1-9: 2
SUM OF ALL RESPONSES TO PHQ QUESTIONS 1-9: 2
SUM OF ALL RESPONSES TO PHQ9 QUESTIONS 1 & 2: 0

## 2025-02-07 NOTE — PROGRESS NOTES
Leslie Briceño is a 34 y.o. female new patient for an annual exam     Chief Complaint   Patient presents with    New Patient    Annual Exam       Problems: Patient wishes to discuss previous endometriosis diagnosis.      OB/GYN History   - IAB x 1  Hx of STI - No  SA - Yes, Male      Patient's last menstrual period was 2025.  Menses: Regular every month without spotting, flow is heavy, usually lasting less than 7 days, and with severe dysmenorrhea  Contraception: None      Health Maintenance  Last Pap:  , Normal per patient, History of abnormal pap  With regard to the Gardisil vaccine, she has received all 3 injections      1. Have you been to the ER, urgent care clinic, or hospitalized since your last visit? New Patient   2. Have you seen or consulted any other health care providers outside of the [facility] since your last visit? New Patient      She declines  a chaperone during the gynecologic exam today.

## 2025-02-07 NOTE — PROGRESS NOTES
Annual Exam    Chief Complaint   Patient presents with    New Patient    Annual Exam       Leslie Briceño is a 34 y.o. presenting for annual exam. Her main concerns today include well woman exam, establish care. She does report heavy painful periods and concerns re: possible endometriosis. Cycles are q 25-30 days and last 5-6 days. She reports that cycles changed after her IAB (surgical). She reports that she has cramping for one full day. The next day she has cramping and spotting. She then has 2 days of heavy bleeding (passage of clots). She then reports a few additional days of spotting. She has seen previous providers who have told her these are normal changes post termination.    Her last pap test was in 202- NILM per patient report. She reports history of LGSIL in .    She is sexually active with male partners. She does not use contraception. She reports she has previously tried cOCPs, Nuvaring and Depo. She got pregnant on cOCPs. She also tried Nuvaring but disliked placing something in the vagina. She disliked irregular spotting on Depo. She reports no history of STIs. She accepts STI testing today.    She has previously completed her Gardasil vaccination series.    OB History    Para Term  AB Living   1       1     SAB IAB Ectopic Molar Multiple Live Births     1              # Outcome Date GA Lbr Niall/2nd Weight Sex Type Anes PTL Lv   1 IAB                Past Medical History:   Diagnosis Date    Abnormal Pap smear of cervix     Anemia     Endometriosis     Headache     LGSIL (low grade squamous intraepithelial dysplasia)     Migraine     Pap smear for cervical cancer screening     Established with GYN       Past Surgical History:   Procedure Laterality Date    GYN  10-    , D&C    WISDOM TOOTH EXTRACTION         Family History   Problem Relation Age of Onset    Heart Disease Mother     Diabetes Mother     Hypertension Mother     High Blood Pressure Mother     High

## 2025-02-08 LAB
ERYTHROCYTE [DISTWIDTH] IN BLOOD BY AUTOMATED COUNT: 18.2 % (ref 11.5–14.5)
FERRITIN SERPL-MCNC: 3 NG/ML (ref 8–252)
HBV SURFACE AG SER QL: <0.1 INDEX
HBV SURFACE AG SER QL: NEGATIVE
HCT VFR BLD AUTO: 30.3 % (ref 35–47)
HCV AB SER IA-ACNC: 0.11 INDEX
HCV AB SERPL QL IA: NONREACTIVE
HGB BLD-MCNC: 8.6 G/DL (ref 11.5–16)
HIV 1+2 AB+HIV1 P24 AG SERPL QL IA: NONREACTIVE
HIV 1/2 RESULT COMMENT: NORMAL
MCH RBC QN AUTO: 20.8 PG (ref 26–34)
MCHC RBC AUTO-ENTMCNC: 28.4 G/DL (ref 30–36.5)
MCV RBC AUTO: 73.4 FL (ref 80–99)
NRBC # BLD: 0 K/UL (ref 0–0.01)
NRBC BLD-RTO: 0 PER 100 WBC
PLATELET # BLD AUTO: 390 K/UL (ref 150–400)
PMV BLD AUTO: 12.4 FL (ref 8.9–12.9)
RBC # BLD AUTO: 4.13 M/UL (ref 3.8–5.2)
WBC # BLD AUTO: 7.8 K/UL (ref 3.6–11)

## 2025-02-10 LAB — T PALLIDUM AB SER QL IA: NON REACTIVE

## 2025-02-13 LAB
C TRACH RRNA CVX QL NAA+PROBE: NEGATIVE
CYTOLOGIST CVX/VAG CYTO: NORMAL
CYTOLOGY CVX/VAG DOC CYTO: NORMAL
CYTOLOGY CVX/VAG DOC THIN PREP: NORMAL
DX ICD CODE: NORMAL
HPV GENOTYPE REFLEX: NORMAL
HPV I/H RISK 4 DNA CVX QL PROBE+SIG AMP: NEGATIVE
N GONORRHOEA RRNA CVX QL NAA+PROBE: NEGATIVE
OTHER STN SPEC: NORMAL
SERVICE CMNT-IMP: NORMAL
STAT OF ADQ CVX/VAG CYTO-IMP: NORMAL
T VAGINALIS RRNA SPEC QL NAA+PROBE: NEGATIVE

## 2025-03-14 NOTE — PROGRESS NOTES
Leslie Briceño is a 34 y.o. female  presents for a problem visit.    Chief Complaint   Patient presents with    Ultrasound     AUB       OB/GYN History   - IAB x 1  Hx of STI - No  SA - Yes, Male      Patient's last menstrual period was 2025.  Menses: Regular every month without spotting, flow is heavy, usually lasting less than 7 days, and with severe dysmenorrhea  Contraception: Condoms        The patient is here for an ultrasound follow-up for pelvic pain and AUB.      Patient's blood pressure noted to be elevated on arrival. Patient stated she's been having a very stressful day and can feel its higher than normal. Blood pressure re-assessed by this RN and vitals reported to MD.      1. Have you been to the ER, urgent care clinic, or hospitalized since your last visit? No  2. Have you seen or consulted any other health care providers outside of the [facility] since your last visit? No      She declines  a chaperone during the gynecologic exam today.

## 2025-03-17 ENCOUNTER — OFFICE VISIT (OUTPATIENT)
Age: 35
End: 2025-03-17
Payer: COMMERCIAL

## 2025-03-17 VITALS
BODY MASS INDEX: 44.61 KG/M2 | TEMPERATURE: 98.6 F | DIASTOLIC BLOOD PRESSURE: 85 MMHG | RESPIRATION RATE: 18 BRPM | SYSTOLIC BLOOD PRESSURE: 123 MMHG | OXYGEN SATURATION: 98 % | WEIGHT: 276.4 LBS | HEART RATE: 65 BPM

## 2025-03-17 DIAGNOSIS — R10.2 PELVIC PAIN: ICD-10-CM

## 2025-03-17 DIAGNOSIS — N93.9 ABNORMAL UTERINE BLEEDING (AUB): Primary | ICD-10-CM

## 2025-03-17 PROCEDURE — 99213 OFFICE O/P EST LOW 20 MIN: CPT | Performed by: OBSTETRICS & GYNECOLOGY

## 2025-03-17 NOTE — PROGRESS NOTES
Problem Visit    Chief Complaint   Patient presents with    Ultrasound     AUB     Leslie Briceño is a 34 y.o.  presenting for problem visit. Her main concern today is follow up ultrasound due to AUB and pelvic pain.    Ultrasound 3/17/2025  Uterus is anteverted, enlarged in size and heterogeneous in echogenicity. There appears to be a possible midline submucosal fibroid measuring 78 x 49 x 68 mm. Endometrial thickness is not able to be measured due to myometrium echogenicity. The cervix measures 2.82 cm. Right adnexa appears within normal limits. Right ovary appears normal. Left adnexa appears within normal limits. Left ovary appears normal. No free fluid in the cul de sac.   IMPRESSION: Enlarged uterus with suspected submucosal/cavity filling fibroid    She presents today with her menstrual calendar. She reports that she bled -. She reports she did have premenstrual cramping. She again bled from - without any significant cramping. She is now spotting.    She was last seen on 2025 for her AE. At that time, she reported heavy painful periods and concerns re: possible endometriosis. Per OV, \"cycles are q 25-30 days and last 5-6 days. She reports that cycles changed after her IAB (surgical). She reports that she has cramping for one full day. The next day she has cramping and spotting. She then has 2 days of heavy bleeding (passage of clots). She then reports a few additional days of spotting. She has seen previous providers who have told her these are normal changes post termination.\"    Past Medical History:   Diagnosis Date    Abnormal Pap smear of cervix     Anemia     Endometriosis     Headache     LGSIL (low grade squamous intraepithelial dysplasia)     Migraine     Pap smear for cervical cancer screening     Established with GYN       Past Surgical History:   Procedure Laterality Date    GYN  10-    , D&C    WISDOM TOOTH EXTRACTION         Family History   Problem Relation

## 2025-04-07 ENCOUNTER — OFFICE VISIT (OUTPATIENT)
Age: 35
End: 2025-04-07
Payer: COMMERCIAL

## 2025-04-07 VITALS
SYSTOLIC BLOOD PRESSURE: 138 MMHG | DIASTOLIC BLOOD PRESSURE: 82 MMHG | BODY MASS INDEX: 45 KG/M2 | RESPIRATION RATE: 16 BRPM | HEIGHT: 66 IN | OXYGEN SATURATION: 98 % | HEART RATE: 84 BPM | TEMPERATURE: 97.8 F | WEIGHT: 280 LBS

## 2025-04-07 DIAGNOSIS — Z00.00 ROUTINE GENERAL MEDICAL EXAMINATION AT A HEALTH CARE FACILITY: Primary | ICD-10-CM

## 2025-04-07 DIAGNOSIS — R10.13 DYSPEPSIA: ICD-10-CM

## 2025-04-07 DIAGNOSIS — N92.0 MENORRHAGIA WITH REGULAR CYCLE: ICD-10-CM

## 2025-04-07 DIAGNOSIS — D50.0 IRON DEFICIENCY ANEMIA DUE TO CHRONIC BLOOD LOSS: ICD-10-CM

## 2025-04-07 PROCEDURE — 99395 PREV VISIT EST AGE 18-39: CPT | Performed by: INTERNAL MEDICINE

## 2025-04-07 RX ORDER — PANTOPRAZOLE SODIUM 40 MG/1
40 TABLET, DELAYED RELEASE ORAL DAILY PRN
Qty: 90 TABLET | Refills: 1 | Status: SHIPPED | OUTPATIENT
Start: 2025-04-07

## 2025-04-07 ASSESSMENT — PATIENT HEALTH QUESTIONNAIRE - PHQ9
SUM OF ALL RESPONSES TO PHQ QUESTIONS 1-9: 0
SUM OF ALL RESPONSES TO PHQ QUESTIONS 1-9: 0
1. LITTLE INTEREST OR PLEASURE IN DOING THINGS: NOT AT ALL
SUM OF ALL RESPONSES TO PHQ QUESTIONS 1-9: 0
SUM OF ALL RESPONSES TO PHQ QUESTIONS 1-9: 0
2. FEELING DOWN, DEPRESSED OR HOPELESS: NOT AT ALL

## 2025-04-07 ASSESSMENT — ENCOUNTER SYMPTOMS
GASTROINTESTINAL NEGATIVE: 1
EYES NEGATIVE: 1
RESPIRATORY NEGATIVE: 1

## 2025-04-07 NOTE — PROGRESS NOTES
Chief Complaint   Patient presents with    Annual Exam           History of Present Illness  The patient presents for a physical exam.    Anxiety  - Reported as not severe but occasionally intense  - Frequent headaches upon awakening, queried if related to anxiety    Dietary Habits  - Consumes bagels for breakfast  - Frequent snacking, with a preference for grapes    Medications and Supplements  - No current medications taken  - Uses a daily vitamin supplement    Acid Reflux  - Managed with Tums  - Refill of pantoprazole requested, used as needed  - No endoscopy performed    Iron Supplements  - Prescribed due to low hemoglobin levels  - Discontinued due to constipation  - Stool softener attempted without success    Menstrual Issues  - D and C procedure scheduled for 04/30/2025 to address heavy menstrual bleeding  - Habit of eating ice reported    Snoring  - No snoring issues reported    Gynecological History  - Frequency and Flow: Heavy    Supplemental information: None    SOCIAL HISTORY  - Works at [employer]    Past Medical History:   Diagnosis Date    Abnormal Pap smear of cervix     Anemia     Endometriosis     Headache     LGSIL (low grade squamous intraepithelial dysplasia)     Migraine     Pap smear for cervical cancer screening     Established with GYN     Review of Systems   Constitutional: Negative.    HENT: Negative.     Eyes: Negative.    Respiratory: Negative.     Cardiovascular: Negative.    Gastrointestinal: Negative.    Endocrine: Negative.    Genitourinary: Negative.    Musculoskeletal: Negative.    Neurological: Negative.    Hematological: Negative.    Psychiatric/Behavioral: Negative.         Vitals:    04/07/25 1248   BP: 138/82   Pulse: 84   Resp: 16   Temp: 97.8 °F (36.6 °C)   SpO2: 98%     Physical Exam    Physical Exam  Vitals and nursing note reviewed.   Constitutional:       General: She is not in acute distress.     Appearance: Normal appearance.well-developed,not diaphoretic.

## 2025-04-11 ENCOUNTER — TELEPHONE (OUTPATIENT)
Age: 35
End: 2025-04-11

## 2025-04-11 DIAGNOSIS — N92.0 SPOTTING: ICD-10-CM

## 2025-04-11 DIAGNOSIS — Z00.00 ROUTINE GENERAL MEDICAL EXAMINATION AT A HEALTH CARE FACILITY: ICD-10-CM

## 2025-04-11 DIAGNOSIS — N92.0 MENORRHAGIA WITH REGULAR CYCLE: Primary | ICD-10-CM

## 2025-04-11 LAB
ALBUMIN SERPL-MCNC: 3.6 G/DL (ref 3.5–5)
ALBUMIN/GLOB SERPL: 0.9 (ref 1.1–2.2)
ALP SERPL-CCNC: 86 U/L (ref 45–117)
ALT SERPL-CCNC: 18 U/L (ref 12–78)
ANION GAP SERPL CALC-SCNC: 6 MMOL/L (ref 2–12)
AST SERPL-CCNC: 18 U/L (ref 15–37)
BASOPHILS # BLD: 0.06 K/UL (ref 0–0.1)
BASOPHILS NFR BLD: 0.9 % (ref 0–1)
BILIRUB SERPL-MCNC: 0.3 MG/DL (ref 0.2–1)
BUN SERPL-MCNC: 12 MG/DL (ref 6–20)
BUN/CREAT SERPL: 12 (ref 12–20)
CALCIUM SERPL-MCNC: 9 MG/DL (ref 8.5–10.1)
CHLORIDE SERPL-SCNC: 107 MMOL/L (ref 97–108)
CHOLEST SERPL-MCNC: 162 MG/DL
CO2 SERPL-SCNC: 23 MMOL/L (ref 21–32)
CREAT SERPL-MCNC: 0.99 MG/DL (ref 0.55–1.02)
DIFFERENTIAL METHOD BLD: ABNORMAL
EOSINOPHIL # BLD: 0.05 K/UL (ref 0–0.4)
EOSINOPHIL NFR BLD: 0.8 % (ref 0–7)
ERYTHROCYTE [DISTWIDTH] IN BLOOD BY AUTOMATED COUNT: 18.9 % (ref 11.5–14.5)
GLOBULIN SER CALC-MCNC: 4.1 G/DL (ref 2–4)
GLUCOSE SERPL-MCNC: 93 MG/DL (ref 65–100)
HCT VFR BLD AUTO: 28.1 % (ref 35–47)
HDLC SERPL-MCNC: 44 MG/DL
HDLC SERPL: 3.7 (ref 0–5)
HGB BLD-MCNC: 8.2 G/DL (ref 11.5–16)
IMM GRANULOCYTES # BLD AUTO: 0.01 K/UL (ref 0–0.04)
IMM GRANULOCYTES NFR BLD AUTO: 0.2 % (ref 0–0.5)
LDLC SERPL CALC-MCNC: 103.6 MG/DL (ref 0–100)
LYMPHOCYTES # BLD: 2.12 K/UL (ref 0.8–3.5)
LYMPHOCYTES NFR BLD: 32.6 % (ref 12–49)
MCH RBC QN AUTO: 19.8 PG (ref 26–34)
MCHC RBC AUTO-ENTMCNC: 29.2 G/DL (ref 30–36.5)
MCV RBC AUTO: 67.7 FL (ref 80–99)
MONOCYTES # BLD: 0.71 K/UL (ref 0–1)
MONOCYTES NFR BLD: 10.9 % (ref 5–13)
NEUTS SEG # BLD: 3.55 K/UL (ref 1.8–8)
NEUTS SEG NFR BLD: 54.6 % (ref 32–75)
NRBC # BLD: 0 K/UL (ref 0–0.01)
NRBC BLD-RTO: 0 PER 100 WBC
PLATELET # BLD AUTO: 383 K/UL (ref 150–400)
POTASSIUM SERPL-SCNC: 4.2 MMOL/L (ref 3.5–5.1)
PROT SERPL-MCNC: 7.7 G/DL (ref 6.4–8.2)
RBC # BLD AUTO: 4.15 M/UL (ref 3.8–5.2)
RBC MORPH BLD: ABNORMAL
SODIUM SERPL-SCNC: 136 MMOL/L (ref 136–145)
TRIGL SERPL-MCNC: 72 MG/DL
TSH SERPL DL<=0.05 MIU/L-ACNC: 0.99 UIU/ML (ref 0.36–3.74)
VLDLC SERPL CALC-MCNC: 14.4 MG/DL
WBC # BLD AUTO: 6.5 K/UL (ref 3.6–11)

## 2025-04-11 NOTE — TELEPHONE ENCOUNTER
----- Message from Lenka PHAM sent at 2025  7:59 AM EDT -----  Regarding: Lab Notification: Samples unable to be obtained  We have been notified that samples could not be obtained for the patient's below most recent lab work. Please place new orders prior to the patient's return.    Patient Name: Leslie Briceño    Patient : 1990    Ordering Provider: Dr EDISON Dexter    No urine obtained for 4/10 order            If you have any questions please call [phone] and a representative will assist you.  Thank you,  [facility] Client Services

## 2025-04-14 ENCOUNTER — RESULTS FOLLOW-UP (OUTPATIENT)
Age: 35
End: 2025-04-14

## 2025-04-14 DIAGNOSIS — D50.0 IRON DEFICIENCY ANEMIA DUE TO CHRONIC BLOOD LOSS: ICD-10-CM

## 2025-04-14 DIAGNOSIS — D50.9 IRON DEFICIENCY ANEMIA, UNSPECIFIED IRON DEFICIENCY ANEMIA TYPE: Primary | ICD-10-CM

## 2025-04-15 DIAGNOSIS — D50.9 IRON DEFICIENCY ANEMIA, UNSPECIFIED IRON DEFICIENCY ANEMIA TYPE: ICD-10-CM

## 2025-04-16 NOTE — TELEPHONE ENCOUNTER
PCP: Tomasa Sepulveda DO    CC: New Patient (Establish care. Patient need bp med refilled. )      Subjective      Chastity Lane is a 67 y.o. female, who presents to [facility] and with the following concerns.    HTN: Currently taking chlorthalidone 25 mg daily. She has been out of her medications for over 1 month. She is tolerating medication with minimal to no side effects.  Denies any headache, chest pain, dizziness, shortness, or breath, or acute changes in vision.     Ambulatory blood pressures: 120s-140s/80-90s    Diet: Drinking water, protein, salads, oatmeal,   Exercise: Dancing for 1 hour 3 times per week  Tobacco use: Denies  Alcohol: Denies  Sleep: 6 hours per night      Review of Systems   Constitutional:  Negative for chills, fever and unexpected weight change.   HENT: Negative.     Eyes:  Negative for visual disturbance.   Respiratory:  Negative for shortness of breath.    Gastrointestinal:  Negative for abdominal pain, constipation and diarrhea.   Genitourinary: Negative.    Musculoskeletal: Negative.    Skin:  Negative for rash.   Neurological:  Negative for dizziness and headaches.   Psychiatric/Behavioral:  Negative for dysphoric mood and suicidal ideas.        Patient Active Problem List   Diagnosis    Essential hypertension    BMI 28.0-28.9,adult       Current Outpatient Medications   Medication Sig Dispense Refill    chlorthalidone (HYGROTON) 25 MG tablet Take 1 tablet by mouth daily 90 tablet 1    atorvastatin (LIPITOR) 20 MG tablet Take 1 tablet by mouth daily 90 tablet 0     No current facility-administered medications for this visit.       History reviewed. No pertinent family history.    Social History     Tobacco Use    Smoking status: Never    Smokeless tobacco: Never   Substance Use Topics    Alcohol use: Never        Reviewed and updated this visit:  Tobacco  Allergies  Meds  Problems  Med Hx  Surg Hx  Soc Hx  Fam Hx     Objective      Vitals:    04/16/25 0804 04/16/25  Ct abdomen order isn't the correct order per department.  The order has to be resubmitted as CT Abdomen Pelvis with Contrast.   If it ask for any additional orders please put Oral.      Please return Feliberto's call with any questions at [phone]

## 2025-04-18 ENCOUNTER — OFFICE VISIT (OUTPATIENT)
Age: 35
End: 2025-04-18
Payer: COMMERCIAL

## 2025-04-18 VITALS
OXYGEN SATURATION: 98 % | WEIGHT: 278 LBS | TEMPERATURE: 98.1 F | DIASTOLIC BLOOD PRESSURE: 88 MMHG | HEIGHT: 66 IN | RESPIRATION RATE: 17 BRPM | SYSTOLIC BLOOD PRESSURE: 122 MMHG | BODY MASS INDEX: 44.68 KG/M2 | HEART RATE: 94 BPM

## 2025-04-18 DIAGNOSIS — F50.89 PICA: ICD-10-CM

## 2025-04-18 DIAGNOSIS — D50.0 IRON DEFICIENCY ANEMIA DUE TO CHRONIC BLOOD LOSS: Primary | ICD-10-CM

## 2025-04-18 PROCEDURE — 99204 OFFICE O/P NEW MOD 45 MIN: CPT | Performed by: INTERNAL MEDICINE

## 2025-04-18 RX ORDER — DIPHENHYDRAMINE HYDROCHLORIDE 50 MG/ML
50 INJECTION, SOLUTION INTRAMUSCULAR; INTRAVENOUS
OUTPATIENT
Start: 2025-04-18

## 2025-04-18 RX ORDER — SODIUM CHLORIDE 0.9 % (FLUSH) 0.9 %
5-40 SYRINGE (ML) INJECTION PRN
OUTPATIENT
Start: 2025-04-18

## 2025-04-18 RX ORDER — EPINEPHRINE 1 MG/ML
0.3 INJECTION, SOLUTION, CONCENTRATE INTRAVENOUS PRN
OUTPATIENT
Start: 2025-04-18

## 2025-04-18 RX ORDER — COVID-19 ANTIGEN TEST
KIT MISCELLANEOUS
COMMUNITY

## 2025-04-18 RX ORDER — ONDANSETRON 2 MG/ML
8 INJECTION INTRAMUSCULAR; INTRAVENOUS
OUTPATIENT
Start: 2025-04-18

## 2025-04-18 RX ORDER — SODIUM CHLORIDE 9 MG/ML
5-250 INJECTION, SOLUTION INTRAVENOUS PRN
OUTPATIENT
Start: 2025-04-18

## 2025-04-18 RX ORDER — FAMOTIDINE 10 MG/ML
20 INJECTION, SOLUTION INTRAVENOUS
OUTPATIENT
Start: 2025-04-18

## 2025-04-18 RX ORDER — ALBUTEROL SULFATE 90 UG/1
4 INHALANT RESPIRATORY (INHALATION) PRN
OUTPATIENT
Start: 2025-04-18

## 2025-04-18 RX ORDER — CETIRIZINE HYDROCHLORIDE 5 MG/1
5 TABLET ORAL DAILY
COMMUNITY

## 2025-04-18 RX ORDER — HEPARIN SODIUM (PORCINE) LOCK FLUSH IV SOLN 100 UNIT/ML 100 UNIT/ML
500 SOLUTION INTRAVENOUS PRN
OUTPATIENT
Start: 2025-04-18

## 2025-04-18 RX ORDER — ACETAMINOPHEN 325 MG/1
650 TABLET ORAL
OUTPATIENT
Start: 2025-04-18

## 2025-04-18 RX ORDER — HYDROCORTISONE SODIUM SUCCINATE 100 MG/2ML
100 INJECTION INTRAMUSCULAR; INTRAVENOUS
OUTPATIENT
Start: 2025-04-18

## 2025-04-18 RX ORDER — SODIUM CHLORIDE 9 MG/ML
INJECTION, SOLUTION INTRAVENOUS CONTINUOUS
OUTPATIENT
Start: 2025-04-18

## 2025-04-18 NOTE — PROGRESS NOTES
Leslie Briceño is a 34 y.o. female who presents for follow up of   Chief Complaint   Patient presents with    New Patient     FLORY       The patient is here for an evaluation. She reports not taking the iron due to abdominal issues such as constipation, abdominal pain, bloating, dark stools.  She reports ice cravings, heavy menses with blood clots, fatigue, insomnia, night sweats,  and blurry vision.   Medications reviewed with the patient, and chart updated to reflect changes.        
Admission medications    Medication Sig Start Date End Date Taking? Authorizing Provider   cetirizine (ZYRTEC) 5 MG tablet Take 1 tablet by mouth daily   Yes ProviderCee MD   Naproxen Sodium 220 MG CAPS Take by mouth   Yes ProviderCee MD   pantoprazole (PROTONIX) 40 MG tablet Take 1 tablet by mouth daily as needed (heartburn) 4/7/25  Yes Saranya Dexter MD   iron-vitamin C  MG TABS 1 tab po qd  Patient not taking: Reported on 4/18/2025 4/7/25   Saranya Dexter MD   ferrous sulfate (IRON 325) 325 (65 Fe) MG tablet Take 1 tablet by mouth every other day With food  Patient not taking: Reported on 4/18/2025 2/2/24 7/31/24  Saranya Dexter MD            No Known Allergies        Objective:     Vitals:    04/18/25 1607   BP: 122/88   BP Site: Left Upper Arm   Patient Position: Sitting   Pulse: 94   Resp: 17   Temp: 98.1 °F (36.7 °C)   TempSrc: Temporal   SpO2: 98%   Weight: 126.1 kg (278 lb)   Height: 1.676 m (5' 6\")        PHYSICAL EXAM:  GENERAL: alert, cooperative, no distress, appears stated age  EYE: conjunctivae/corneas clear  LYMPHATIC: Cervical, supraclavicular, and axillary nodes normal.   THROAT & NECK: normal and no erythema or exudates noted.   LUNG: clear to auscultation bilaterally  HEART: regular rate and rhythm  ABDOMEN: soft, non-tender, non-distended.   EXTREMITIES:  extremities normal, atraumatic, no cyanosis or edema  SKIN: Normal.  NEUROLOGIC: AOx3. Gait normal. No gross motor/sensory deficit.          Lab Results   Component Value Date/Time    WBC 6.5 04/10/2025 07:43 AM    HGB 8.2 04/10/2025 07:43 AM    HCT 28.1 04/10/2025 07:43 AM     04/10/2025 07:43 AM    MCV 67.7 04/10/2025 07:43 AM           Lab Results   Component Value Date/Time    IRON 21 10/03/2023 08:46 AM    TIBC 382 10/03/2023 08:46 AM           Assessment:     1. Iron deficiency anemia secondary to menstrual loss:    I discussed with her various ways to replace/supplement iron which includes giving oral iron

## 2025-04-21 ENCOUNTER — CLINICAL DOCUMENTATION (OUTPATIENT)
Age: 35
End: 2025-04-21

## 2025-04-21 DIAGNOSIS — D50.0 IRON DEFICIENCY ANEMIA DUE TO CHRONIC BLOOD LOSS: ICD-10-CM

## 2025-04-21 DIAGNOSIS — D50.0 IRON DEFICIENCY ANEMIA DUE TO CHRONIC BLOOD LOSS: Primary | ICD-10-CM

## 2025-04-23 ENCOUNTER — RESULTS FOLLOW-UP (OUTPATIENT)
Age: 35
End: 2025-04-23

## 2025-04-23 LAB
BASOPHILS # BLD: 0.08 K/UL (ref 0–0.1)
BASOPHILS NFR BLD: 1 % (ref 0–1)
DIFFERENTIAL METHOD BLD: ABNORMAL
EOSINOPHIL # BLD: 0.05 K/UL (ref 0–0.4)
EOSINOPHIL NFR BLD: 0.6 % (ref 0–7)
ERYTHROCYTE [DISTWIDTH] IN BLOOD BY AUTOMATED COUNT: 21.1 % (ref 11.5–14.5)
FERRITIN SERPL-MCNC: 4 NG/ML (ref 8–252)
FOLATE SERPL-MCNC: 14.6 NG/ML (ref 5–21)
HCT VFR BLD AUTO: 29.5 % (ref 35–47)
HGB BLD-MCNC: 8.4 G/DL (ref 11.5–16)
IMM GRANULOCYTES # BLD AUTO: 0.02 K/UL (ref 0–0.04)
IMM GRANULOCYTES NFR BLD AUTO: 0.3 % (ref 0–0.5)
IRON SATN MFR SERPL: 3 % (ref 20–50)
IRON SERPL-MCNC: 13 UG/DL (ref 35–150)
LYMPHOCYTES # BLD: 3.17 K/UL (ref 0.8–3.5)
LYMPHOCYTES NFR BLD: 39.6 % (ref 12–49)
MCH RBC QN AUTO: 20 PG (ref 26–34)
MCHC RBC AUTO-ENTMCNC: 28.5 G/DL (ref 30–36.5)
MCV RBC AUTO: 70.2 FL (ref 80–99)
MONOCYTES # BLD: 0.87 K/UL (ref 0–1)
MONOCYTES NFR BLD: 10.9 % (ref 5–13)
NEUTS SEG # BLD: 3.81 K/UL (ref 1.8–8)
NEUTS SEG NFR BLD: 47.6 % (ref 32–75)
NRBC # BLD: 0 K/UL (ref 0–0.01)
NRBC BLD-RTO: 0 PER 100 WBC
PLATELET # BLD AUTO: 425 K/UL (ref 150–400)
PMV BLD AUTO: 12.4 FL (ref 8.9–12.9)
RBC # BLD AUTO: 4.2 M/UL (ref 3.8–5.2)
RBC MORPH BLD: ABNORMAL
TIBC SERPL-MCNC: 455 UG/DL (ref 250–450)
VIT B12 SERPL-MCNC: 548 PG/ML (ref 193–986)
WBC # BLD AUTO: 8 K/UL (ref 3.6–11)

## 2025-04-29 ENCOUNTER — HOSPITAL ENCOUNTER (OUTPATIENT)
Facility: HOSPITAL | Age: 35
Setting detail: INFUSION SERIES
Discharge: HOME | End: 2025-04-29
Payer: COMMERCIAL

## 2025-04-29 VITALS
DIASTOLIC BLOOD PRESSURE: 78 MMHG | SYSTOLIC BLOOD PRESSURE: 146 MMHG | HEART RATE: 95 BPM | TEMPERATURE: 98.9 F | RESPIRATION RATE: 16 BRPM

## 2025-04-29 DIAGNOSIS — F50.89 PICA: Primary | ICD-10-CM

## 2025-04-29 DIAGNOSIS — D50.0 IRON DEFICIENCY ANEMIA DUE TO CHRONIC BLOOD LOSS: ICD-10-CM

## 2025-04-29 PROCEDURE — 6360000002 HC RX W HCPCS: Performed by: NURSE PRACTITIONER

## 2025-04-29 PROCEDURE — 96374 THER/PROPH/DIAG INJ IV PUSH: CPT

## 2025-04-29 PROCEDURE — 2580000003 HC RX 258: Performed by: NURSE PRACTITIONER

## 2025-04-29 RX ORDER — ACETAMINOPHEN 325 MG/1
650 TABLET ORAL
OUTPATIENT
Start: 2025-05-06

## 2025-04-29 RX ORDER — EPINEPHRINE 1 MG/ML
0.3 INJECTION, SOLUTION INTRAMUSCULAR; SUBCUTANEOUS PRN
OUTPATIENT
Start: 2025-05-06

## 2025-04-29 RX ORDER — SODIUM CHLORIDE 0.9 % (FLUSH) 0.9 %
5-40 SYRINGE (ML) INJECTION PRN
OUTPATIENT
Start: 2025-05-06

## 2025-04-29 RX ORDER — ACETAMINOPHEN 325 MG/1
650 TABLET ORAL
Status: DISCONTINUED | OUTPATIENT
Start: 2025-04-29 | End: 2025-04-30 | Stop reason: HOSPADM

## 2025-04-29 RX ORDER — EPINEPHRINE 1 MG/ML
0.3 INJECTION, SOLUTION INTRAMUSCULAR; SUBCUTANEOUS PRN
Status: DISCONTINUED | OUTPATIENT
Start: 2025-04-29 | End: 2025-04-30 | Stop reason: HOSPADM

## 2025-04-29 RX ORDER — SODIUM CHLORIDE 9 MG/ML
5-250 INJECTION, SOLUTION INTRAVENOUS PRN
Status: DISCONTINUED | OUTPATIENT
Start: 2025-04-29 | End: 2025-04-30 | Stop reason: HOSPADM

## 2025-04-29 RX ORDER — SODIUM CHLORIDE 9 MG/ML
5-250 INJECTION, SOLUTION INTRAVENOUS PRN
OUTPATIENT
Start: 2025-05-06

## 2025-04-29 RX ORDER — HYDROCORTISONE SODIUM SUCCINATE 100 MG/2ML
100 INJECTION INTRAMUSCULAR; INTRAVENOUS
OUTPATIENT
Start: 2025-05-06

## 2025-04-29 RX ORDER — ALBUTEROL SULFATE 90 UG/1
4 INHALANT RESPIRATORY (INHALATION) PRN
OUTPATIENT
Start: 2025-05-06

## 2025-04-29 RX ORDER — ONDANSETRON 2 MG/ML
8 INJECTION INTRAMUSCULAR; INTRAVENOUS
Status: DISCONTINUED | OUTPATIENT
Start: 2025-04-29 | End: 2025-04-30 | Stop reason: HOSPADM

## 2025-04-29 RX ORDER — ONDANSETRON 2 MG/ML
8 INJECTION INTRAMUSCULAR; INTRAVENOUS
OUTPATIENT
Start: 2025-05-06

## 2025-04-29 RX ORDER — SODIUM CHLORIDE 9 MG/ML
INJECTION, SOLUTION INTRAVENOUS CONTINUOUS
OUTPATIENT
Start: 2025-05-06

## 2025-04-29 RX ORDER — SODIUM CHLORIDE 0.9 % (FLUSH) 0.9 %
5-40 SYRINGE (ML) INJECTION PRN
Status: DISCONTINUED | OUTPATIENT
Start: 2025-04-29 | End: 2025-04-30 | Stop reason: HOSPADM

## 2025-04-29 RX ORDER — HYDROCORTISONE SODIUM SUCCINATE 100 MG/2ML
100 INJECTION INTRAMUSCULAR; INTRAVENOUS
Status: DISCONTINUED | OUTPATIENT
Start: 2025-04-29 | End: 2025-04-30 | Stop reason: HOSPADM

## 2025-04-29 RX ORDER — DIPHENHYDRAMINE HYDROCHLORIDE 50 MG/ML
50 INJECTION, SOLUTION INTRAMUSCULAR; INTRAVENOUS
OUTPATIENT
Start: 2025-05-06

## 2025-04-29 RX ORDER — HEPARIN 100 UNIT/ML
500 SYRINGE INTRAVENOUS PRN
OUTPATIENT
Start: 2025-05-06

## 2025-04-29 RX ORDER — ALBUTEROL SULFATE 90 UG/1
4 INHALANT RESPIRATORY (INHALATION) PRN
Status: DISCONTINUED | OUTPATIENT
Start: 2025-04-29 | End: 2025-04-30 | Stop reason: HOSPADM

## 2025-04-29 RX ORDER — SODIUM CHLORIDE 9 MG/ML
INJECTION, SOLUTION INTRAVENOUS CONTINUOUS
Status: DISCONTINUED | OUTPATIENT
Start: 2025-04-29 | End: 2025-04-30 | Stop reason: HOSPADM

## 2025-04-29 RX ORDER — DIPHENHYDRAMINE HYDROCHLORIDE 50 MG/ML
50 INJECTION, SOLUTION INTRAMUSCULAR; INTRAVENOUS
Status: DISCONTINUED | OUTPATIENT
Start: 2025-04-29 | End: 2025-04-30 | Stop reason: HOSPADM

## 2025-04-29 RX ADMIN — SODIUM CHLORIDE 25 ML/HR: 9 INJECTION, SOLUTION INTRAVENOUS at 15:22

## 2025-04-29 RX ADMIN — IRON SUCROSE 200 MG: 20 INJECTION, SOLUTION INTRAVENOUS at 15:19

## 2025-04-29 NOTE — PROGRESS NOTES
Outpatient [facility]fusion Center Short Visit Progress Note    Ms. Briceño admitted to [facility] for Venofer ambulatory in stable condition. Assessment completed. Patient has nausea and chronic migraines, feeling well today other than fatigue.    Vital Signs:  BP (!) 146/78   Pulse 95   Temp 98.9 °F (37.2 °C) (Temporal)   Resp 16   LMP 04/08/2025 (Approximate)       Left hand PIV with positive blood return.       Medications:  Medications Administered         0.9 % sodium chloride infusion Admin Date  04/29/2025 Action  New Bag Dose  25 mL/hr Rate  25 mL/hr Route  IntraVENous Documented By  Chandrika Choi RN        iron sucrose (VENOFER) injection 200 mg Admin Date  04/29/2025 Action  Given Dose  200 mg Rate   Route  IntraVENous Documented By  Chandrika Choi RN            Patient tolerated treatment well. Patient discharged from Outpatient Infusion Center ambulatory in no distress. Patient aware of next appointment.    Chandrika Choi RN  April 29, 2025    Future Appointments   Date Time Provider Department Center   [facility]  [facility]   [facility]25  9[facility]00 AM [facility]   5/6/2025  3:00 PM OTT CHEMO CHAIR 14 [facility]   5/13/2025  3:00 PM OTT CHEMO CHAIR 1 [facility]   5/20/2025  3:00 PM OTT CHEMO CHAIR 3 [facility]   5/27/2025  3:00 PM OTT CHEMO CHAIR 14 [facility]   10/14/2025 11:30 AM Saranya Dexter MD [facility] DEP

## 2025-04-30 ENCOUNTER — OFFICE VISIT (OUTPATIENT)
Age: 35
End: 2025-04-30
Payer: COMMERCIAL

## 2025-04-30 VITALS
BODY MASS INDEX: 44.71 KG/M2 | HEART RATE: 88 BPM | SYSTOLIC BLOOD PRESSURE: 118 MMHG | DIASTOLIC BLOOD PRESSURE: 83 MMHG | TEMPERATURE: 98.8 F | WEIGHT: 277 LBS | RESPIRATION RATE: 16 BRPM | OXYGEN SATURATION: 96 %

## 2025-04-30 DIAGNOSIS — D25.0 SUBMUCOUS LEIOMYOMA OF UTERUS: Primary | ICD-10-CM

## 2025-04-30 DIAGNOSIS — N93.9 ABNORMAL UTERINE BLEEDING (AUB): Primary | ICD-10-CM

## 2025-04-30 LAB
HCG, PREGNANCY, URINE, POC: NEGATIVE
VALID INTERNAL CONTROL, POC: YES

## 2025-04-30 PROCEDURE — 81025 URINE PREGNANCY TEST: CPT | Performed by: OBSTETRICS & GYNECOLOGY

## 2025-04-30 PROCEDURE — 99213 OFFICE O/P EST LOW 20 MIN: CPT | Performed by: OBSTETRICS & GYNECOLOGY

## 2025-05-06 ENCOUNTER — HOSPITAL ENCOUNTER (OUTPATIENT)
Facility: HOSPITAL | Age: 35
Setting detail: INFUSION SERIES
Discharge: HOME | End: 2025-05-06
Payer: COMMERCIAL

## 2025-05-06 VITALS
HEIGHT: 66 IN | RESPIRATION RATE: 16 BRPM | TEMPERATURE: 98.2 F | DIASTOLIC BLOOD PRESSURE: 83 MMHG | BODY MASS INDEX: 44.73 KG/M2 | SYSTOLIC BLOOD PRESSURE: 152 MMHG | HEART RATE: 79 BPM

## 2025-05-06 DIAGNOSIS — D50.0 IRON DEFICIENCY ANEMIA DUE TO CHRONIC BLOOD LOSS: ICD-10-CM

## 2025-05-06 DIAGNOSIS — F50.89 PICA: Primary | ICD-10-CM

## 2025-05-06 PROCEDURE — 6360000002 HC RX W HCPCS: Performed by: NURSE PRACTITIONER

## 2025-05-06 PROCEDURE — 96374 THER/PROPH/DIAG INJ IV PUSH: CPT

## 2025-05-06 RX ORDER — SODIUM CHLORIDE 0.9 % (FLUSH) 0.9 %
5-40 SYRINGE (ML) INJECTION PRN
OUTPATIENT
Start: 2025-05-13

## 2025-05-06 RX ORDER — SODIUM CHLORIDE 9 MG/ML
INJECTION, SOLUTION INTRAVENOUS CONTINUOUS
OUTPATIENT
Start: 2025-05-13

## 2025-05-06 RX ORDER — SODIUM CHLORIDE 9 MG/ML
5-250 INJECTION, SOLUTION INTRAVENOUS PRN
Status: CANCELLED | OUTPATIENT
Start: 2025-05-13

## 2025-05-06 RX ORDER — SODIUM CHLORIDE 9 MG/ML
5-250 INJECTION, SOLUTION INTRAVENOUS PRN
Status: DISCONTINUED | OUTPATIENT
Start: 2025-05-06 | End: 2025-05-07 | Stop reason: HOSPADM

## 2025-05-06 RX ORDER — DIPHENHYDRAMINE HYDROCHLORIDE 50 MG/ML
50 INJECTION, SOLUTION INTRAMUSCULAR; INTRAVENOUS
OUTPATIENT
Start: 2025-05-13

## 2025-05-06 RX ORDER — ONDANSETRON 2 MG/ML
8 INJECTION INTRAMUSCULAR; INTRAVENOUS
OUTPATIENT
Start: 2025-05-13

## 2025-05-06 RX ORDER — ALBUTEROL SULFATE 90 UG/1
4 INHALANT RESPIRATORY (INHALATION) PRN
OUTPATIENT
Start: 2025-05-13

## 2025-05-06 RX ORDER — EPINEPHRINE 1 MG/ML
0.3 INJECTION, SOLUTION INTRAMUSCULAR; SUBCUTANEOUS PRN
OUTPATIENT
Start: 2025-05-13

## 2025-05-06 RX ORDER — HEPARIN 100 UNIT/ML
500 SYRINGE INTRAVENOUS PRN
OUTPATIENT
Start: 2025-05-13

## 2025-05-06 RX ORDER — HYDROCORTISONE SODIUM SUCCINATE 100 MG/2ML
100 INJECTION INTRAMUSCULAR; INTRAVENOUS
OUTPATIENT
Start: 2025-05-13

## 2025-05-06 RX ORDER — SODIUM CHLORIDE 9 MG/ML
5-250 INJECTION, SOLUTION INTRAVENOUS PRN
OUTPATIENT
Start: 2025-05-13

## 2025-05-06 RX ORDER — ACETAMINOPHEN 325 MG/1
650 TABLET ORAL
OUTPATIENT
Start: 2025-05-13

## 2025-05-06 RX ADMIN — IRON SUCROSE 200 MG: 20 INJECTION, SOLUTION INTRAVENOUS at 15:13

## 2025-05-06 NOTE — PROGRESS NOTES
[facility] Visit Note    Pt arrived to [facility] ambulatory in no acute distress for Venofer 2/5.  Assessment unchanged.  IV established in RAC without issue and positive blood return noted.      The following medications administered:  Medications Administered         iron sucrose (VENOFER) injection 200 mg Admin Date  05/06/2025 Action  Given Dose  200 mg Route  IntraVENous Documented By  Rosi Paulino RN          Patient Vitals for the past 24 hrs:   BP Temp Temp src Pulse Resp Height   05/06/25 1545 (!) 152/83 -- -- 79 16 --   05/06/25 1445 (!) 142/85 98.2 °F (36.8 °C) Temporal 87 16 1.676 m (5' 5.98\")     Pt tolerated treatment well; remained for 30 minute observation period.  IV flushed per policy and removed, 2x2 and coban placed.  Pt discharged ambulatory in no acute distress.  Next appointments:  Future Appointments   Date Time Provider Department Center   5/13/2025  3:00 PM NAMRATA CHEMO CHAIR 1 [facility]   5/20/2025  3:00 PM NAMRATA CHEMO CHAIR 3 [facility]   5/27/2025  3:00 PM [facility] CHEMO CHAIR 14 [facility]   10/14/2025 11:30 AM Saranya Dexter MD [facility] DEP

## 2025-05-13 ENCOUNTER — HOSPITAL ENCOUNTER (OUTPATIENT)
Facility: HOSPITAL | Age: 35
Setting detail: INFUSION SERIES
Discharge: HOME | End: 2025-05-13
Payer: COMMERCIAL

## 2025-05-13 VITALS
DIASTOLIC BLOOD PRESSURE: 74 MMHG | SYSTOLIC BLOOD PRESSURE: 111 MMHG | RESPIRATION RATE: 16 BRPM | TEMPERATURE: 98 F | HEART RATE: 80 BPM

## 2025-05-13 DIAGNOSIS — D50.0 IRON DEFICIENCY ANEMIA DUE TO CHRONIC BLOOD LOSS: ICD-10-CM

## 2025-05-13 DIAGNOSIS — F50.89 PICA: Primary | ICD-10-CM

## 2025-05-13 PROCEDURE — 96374 THER/PROPH/DIAG INJ IV PUSH: CPT

## 2025-05-13 PROCEDURE — 6360000002 HC RX W HCPCS: Performed by: NURSE PRACTITIONER

## 2025-05-13 RX ORDER — HEPARIN 100 UNIT/ML
500 SYRINGE INTRAVENOUS PRN
Status: CANCELLED | OUTPATIENT
Start: 2025-05-20

## 2025-05-13 RX ORDER — EPINEPHRINE 1 MG/ML
0.3 INJECTION, SOLUTION INTRAMUSCULAR; SUBCUTANEOUS PRN
Status: CANCELLED | OUTPATIENT
Start: 2025-05-20

## 2025-05-13 RX ORDER — HYDROCORTISONE SODIUM SUCCINATE 100 MG/2ML
100 INJECTION INTRAMUSCULAR; INTRAVENOUS
Status: CANCELLED | OUTPATIENT
Start: 2025-05-20

## 2025-05-13 RX ORDER — ACETAMINOPHEN 325 MG/1
650 TABLET ORAL
Status: CANCELLED | OUTPATIENT
Start: 2025-05-20

## 2025-05-13 RX ORDER — SODIUM CHLORIDE 9 MG/ML
INJECTION, SOLUTION INTRAVENOUS CONTINUOUS
Status: CANCELLED | OUTPATIENT
Start: 2025-05-20

## 2025-05-13 RX ORDER — SODIUM CHLORIDE 9 MG/ML
5-250 INJECTION, SOLUTION INTRAVENOUS PRN
Status: CANCELLED | OUTPATIENT
Start: 2025-05-20

## 2025-05-13 RX ORDER — ONDANSETRON 2 MG/ML
8 INJECTION INTRAMUSCULAR; INTRAVENOUS
Status: CANCELLED | OUTPATIENT
Start: 2025-05-20

## 2025-05-13 RX ORDER — SODIUM CHLORIDE 9 MG/ML
5-250 INJECTION, SOLUTION INTRAVENOUS PRN
Status: DISCONTINUED | OUTPATIENT
Start: 2025-05-13 | End: 2025-05-14 | Stop reason: HOSPADM

## 2025-05-13 RX ORDER — SODIUM CHLORIDE 0.9 % (FLUSH) 0.9 %
5-40 SYRINGE (ML) INJECTION PRN
Status: CANCELLED | OUTPATIENT
Start: 2025-05-20

## 2025-05-13 RX ORDER — DIPHENHYDRAMINE HYDROCHLORIDE 50 MG/ML
50 INJECTION, SOLUTION INTRAMUSCULAR; INTRAVENOUS
Status: CANCELLED | OUTPATIENT
Start: 2025-05-20

## 2025-05-13 RX ORDER — ALBUTEROL SULFATE 90 UG/1
4 INHALANT RESPIRATORY (INHALATION) PRN
Status: CANCELLED | OUTPATIENT
Start: 2025-05-20

## 2025-05-13 RX ADMIN — IRON SUCROSE 200 MG: 20 INJECTION, SOLUTION INTRAVENOUS at 15:15

## 2025-05-13 NOTE — PROGRESS NOTES
Name: Leslie Briceño    MRN: [id number]         : 1990    Ms. Briceño Arrived ambulatory and in no distress for Venofer Dose 3 of 5.  Assessment was completed, no acute issues at this time, no new complaints voiced.  24 G PIV established to left hand without difficulty.     Ms. Briceño's vitals were reviewed.  Patient Vitals for the past 24 hrs:   BP Temp Temp src Pulse Resp   25 1551 111/74 -- -- 80 16   25 1509 126/83 98 °F (36.7 °C) Temporal 92 16     Medications:  Medications Administered         iron sucrose (VENOFER) injection 200 mg Admin Date  2025 Action  Given Dose  200 mg Route  IntraVENous Documented By  Susie Chapman, RN          Ms. Briceño tolerated treatment well and was discharged from Outpatient [facility]fusion Center in stable condition. PIV flushed & removed.  Discharge instructions reviewed with patient, verbalized understanding.  Patient observed for 30 minutes post infusion, no s/s of reaction  She is to return on 2025 for her next appointment.    SUSIE CHAPMAN RN  May 13, 2025

## 2025-05-15 DIAGNOSIS — D50.9 IRON DEFICIENCY ANEMIA, UNSPECIFIED IRON DEFICIENCY ANEMIA TYPE: ICD-10-CM

## 2025-05-20 ENCOUNTER — HOSPITAL ENCOUNTER (OUTPATIENT)
Facility: HOSPITAL | Age: 35
Setting detail: INFUSION SERIES
Discharge: HOME | End: 2025-05-20
Payer: COMMERCIAL

## 2025-05-20 VITALS
TEMPERATURE: 97.9 F | RESPIRATION RATE: 16 BRPM | HEART RATE: 82 BPM | SYSTOLIC BLOOD PRESSURE: 120 MMHG | DIASTOLIC BLOOD PRESSURE: 78 MMHG

## 2025-05-20 DIAGNOSIS — D50.0 IRON DEFICIENCY ANEMIA DUE TO CHRONIC BLOOD LOSS: ICD-10-CM

## 2025-05-20 DIAGNOSIS — F50.89 PICA: Primary | ICD-10-CM

## 2025-05-20 PROCEDURE — 6360000002 HC RX W HCPCS: Performed by: NURSE PRACTITIONER

## 2025-05-20 PROCEDURE — 96374 THER/PROPH/DIAG INJ IV PUSH: CPT

## 2025-05-20 RX ORDER — EPINEPHRINE 1 MG/ML
0.3 INJECTION, SOLUTION INTRAMUSCULAR; SUBCUTANEOUS PRN
Status: CANCELLED | OUTPATIENT
Start: 2025-05-27

## 2025-05-20 RX ORDER — ONDANSETRON 2 MG/ML
8 INJECTION INTRAMUSCULAR; INTRAVENOUS
Status: CANCELLED | OUTPATIENT
Start: 2025-05-27

## 2025-05-20 RX ORDER — ACETAMINOPHEN 325 MG/1
650 TABLET ORAL
Status: CANCELLED | OUTPATIENT
Start: 2025-05-27

## 2025-05-20 RX ORDER — DIPHENHYDRAMINE HYDROCHLORIDE 50 MG/ML
50 INJECTION, SOLUTION INTRAMUSCULAR; INTRAVENOUS
Status: CANCELLED | OUTPATIENT
Start: 2025-05-27

## 2025-05-20 RX ORDER — SODIUM CHLORIDE 9 MG/ML
5-250 INJECTION, SOLUTION INTRAVENOUS PRN
Status: DISCONTINUED | OUTPATIENT
Start: 2025-05-20 | End: 2025-05-21 | Stop reason: HOSPADM

## 2025-05-20 RX ORDER — SODIUM CHLORIDE 9 MG/ML
INJECTION, SOLUTION INTRAVENOUS CONTINUOUS
Status: CANCELLED | OUTPATIENT
Start: 2025-05-27

## 2025-05-20 RX ORDER — HYDROCORTISONE SODIUM SUCCINATE 100 MG/2ML
100 INJECTION INTRAMUSCULAR; INTRAVENOUS
Status: CANCELLED | OUTPATIENT
Start: 2025-05-27

## 2025-05-20 RX ORDER — HEPARIN 100 UNIT/ML
500 SYRINGE INTRAVENOUS PRN
Status: CANCELLED | OUTPATIENT
Start: 2025-05-27

## 2025-05-20 RX ORDER — SODIUM CHLORIDE 9 MG/ML
5-250 INJECTION, SOLUTION INTRAVENOUS PRN
Status: CANCELLED | OUTPATIENT
Start: 2025-05-27

## 2025-05-20 RX ORDER — SODIUM CHLORIDE 0.9 % (FLUSH) 0.9 %
5-40 SYRINGE (ML) INJECTION PRN
Status: CANCELLED | OUTPATIENT
Start: 2025-05-27

## 2025-05-20 RX ORDER — ALBUTEROL SULFATE 90 UG/1
4 INHALANT RESPIRATORY (INHALATION) PRN
Status: CANCELLED | OUTPATIENT
Start: 2025-05-27

## 2025-05-20 RX ADMIN — IRON SUCROSE 200 MG: 20 INJECTION, SOLUTION INTRAVENOUS at 15:12

## 2025-05-20 NOTE — PROGRESS NOTES
Name: Leslie Briceño    MRN: [id number]         : 1990    Ms. Briceño Arrived ambulatory and in no distress for Dose 4/5 for Venofer. Assessment was completed, no acute issues at this time, no new complaints voiced. 24 G PIV established to left AC arm without difficulty, on second attempt. Needed a heat pack for establishing PIV today. VS obtained.       Ms. Briceño's vitals were reviewed.  Patient Vitals for the past 24 hrs:   BP Temp Temp src Pulse Resp   25 1539 120/78 -- -- -- --   25 1504 138/85 97.9 °F (36.6 °C) Temporal 82 16         Lab results were obtained and reviewed.  No results found for this or any previous visit (from the past 12 hours).    Medications:  Medications Administered         iron sucrose (VENOFER) injection 200 mg Admin Date  2025 Action  Given Dose  200 mg Route  IntraVENous Documented By  Brittanie Mixon, RN          +NS 0.9% flush x 3     Ms. Briceño tolerated treatment well and [facility]. PIV flushed & removed. Discharge instructions reviewed with patient, verbalized understanding. Patient politely declined to stay 30 minutes post infusion for monitoring.  She is to return on 2025 for her next appointment.    Brittanie Mixon RN  May 20, 2025

## 2025-05-27 ENCOUNTER — HOSPITAL ENCOUNTER (OUTPATIENT)
Facility: HOSPITAL | Age: 35
Setting detail: INFUSION SERIES
Discharge: HOME | End: 2025-05-27
Payer: COMMERCIAL

## 2025-05-27 VITALS
RESPIRATION RATE: 16 BRPM | HEART RATE: 80 BPM | DIASTOLIC BLOOD PRESSURE: 80 MMHG | SYSTOLIC BLOOD PRESSURE: 136 MMHG | TEMPERATURE: 98.4 F

## 2025-05-27 DIAGNOSIS — D50.0 IRON DEFICIENCY ANEMIA DUE TO CHRONIC BLOOD LOSS: ICD-10-CM

## 2025-05-27 DIAGNOSIS — F50.89 PICA: Primary | ICD-10-CM

## 2025-05-27 PROCEDURE — 6360000002 HC RX W HCPCS: Performed by: NURSE PRACTITIONER

## 2025-05-27 PROCEDURE — 96374 THER/PROPH/DIAG INJ IV PUSH: CPT

## 2025-05-27 RX ORDER — SODIUM CHLORIDE 9 MG/ML
5-250 INJECTION, SOLUTION INTRAVENOUS PRN
OUTPATIENT
Start: 2025-06-03

## 2025-05-27 RX ORDER — ACETAMINOPHEN 325 MG/1
650 TABLET ORAL
OUTPATIENT
Start: 2025-06-03

## 2025-05-27 RX ORDER — HEPARIN 100 UNIT/ML
500 SYRINGE INTRAVENOUS PRN
OUTPATIENT
Start: 2025-06-03

## 2025-05-27 RX ORDER — ALBUTEROL SULFATE 90 UG/1
4 INHALANT RESPIRATORY (INHALATION) PRN
OUTPATIENT
Start: 2025-06-03

## 2025-05-27 RX ORDER — EPINEPHRINE 1 MG/ML
0.3 INJECTION, SOLUTION INTRAMUSCULAR; SUBCUTANEOUS PRN
OUTPATIENT
Start: 2025-06-03

## 2025-05-27 RX ORDER — ONDANSETRON 2 MG/ML
8 INJECTION INTRAMUSCULAR; INTRAVENOUS
OUTPATIENT
Start: 2025-06-03

## 2025-05-27 RX ORDER — DIPHENHYDRAMINE HYDROCHLORIDE 50 MG/ML
50 INJECTION, SOLUTION INTRAMUSCULAR; INTRAVENOUS
OUTPATIENT
Start: 2025-06-03

## 2025-05-27 RX ORDER — SODIUM CHLORIDE 9 MG/ML
INJECTION, SOLUTION INTRAVENOUS CONTINUOUS
OUTPATIENT
Start: 2025-06-03

## 2025-05-27 RX ORDER — SODIUM CHLORIDE 0.9 % (FLUSH) 0.9 %
5-40 SYRINGE (ML) INJECTION PRN
OUTPATIENT
Start: 2025-06-03

## 2025-05-27 RX ORDER — HYDROCORTISONE SODIUM SUCCINATE 100 MG/2ML
100 INJECTION INTRAMUSCULAR; INTRAVENOUS
OUTPATIENT
Start: 2025-06-03

## 2025-05-27 RX ORDER — SODIUM CHLORIDE 9 MG/ML
5-250 INJECTION, SOLUTION INTRAVENOUS PRN
Status: DISCONTINUED | OUTPATIENT
Start: 2025-05-27 | End: 2025-05-28 | Stop reason: HOSPADM

## 2025-05-27 RX ADMIN — IRON SUCROSE 200 MG: 20 INJECTION, SOLUTION INTRAVENOUS at 15:15

## 2025-05-27 NOTE — PROGRESS NOTES
[facility] Visit Note    Pt arrived to [facility] ambulatory in no acute distress for Venofer 5/5.  Assessment unchanged.  IV established in RAC without issue and positive blood return noted.      The following medications administered:  Medications Administered         iron sucrose (VENOFER) injection 200 mg Admin Date  05/27/2025 Action  Given Dose  200 mg Route  IntraVENous Documented By  Rosi Paulino RN          Pt tolerated treatment well; remained for 30 minute observation.  IV flushed per policy and removed, 2x2 and coban placed.  Pt discharged ambulatory in no acute distress.  Next appointments:  Future Appointments   Date Time Provider Department Center   10/14/2025 11:30 AM Saranya Dexter MD [facility] DE[facility]

## 2025-07-23 ENCOUNTER — CLINICAL DOCUMENTATION (OUTPATIENT)
Age: 35
End: 2025-07-23

## 2025-07-23 DIAGNOSIS — D50.0 IRON DEFICIENCY ANEMIA DUE TO CHRONIC BLOOD LOSS: Primary | ICD-10-CM

## 2025-07-23 NOTE — PROGRESS NOTES
Pt inquiring when next set of labs are needed.    Last labs were 4/21/25.    VORB FROM DR ZIMMERMAN IRON PROFILE FERRITIN AND CBC WITH DIFF NOW

## 2025-08-15 ENCOUNTER — OFFICE VISIT (OUTPATIENT)
Age: 35
End: 2025-08-15
Payer: COMMERCIAL

## 2025-08-15 VITALS
DIASTOLIC BLOOD PRESSURE: 72 MMHG | WEIGHT: 280 LBS | SYSTOLIC BLOOD PRESSURE: 106 MMHG | BODY MASS INDEX: 46.65 KG/M2 | OXYGEN SATURATION: 98 % | RESPIRATION RATE: 14 BRPM | HEIGHT: 65 IN | HEART RATE: 77 BPM | TEMPERATURE: 98 F

## 2025-08-15 DIAGNOSIS — K13.70 ORAL LESION: Primary | ICD-10-CM

## 2025-08-15 DIAGNOSIS — G47.00 INSOMNIA, UNSPECIFIED TYPE: ICD-10-CM

## 2025-08-15 DIAGNOSIS — N92.6 MENSTRUAL PROBLEM: ICD-10-CM

## 2025-08-15 PROCEDURE — 99214 OFFICE O/P EST MOD 30 MIN: CPT | Performed by: CLINICAL NURSE SPECIALIST
